# Patient Record
Sex: FEMALE | Race: BLACK OR AFRICAN AMERICAN | NOT HISPANIC OR LATINO
[De-identification: names, ages, dates, MRNs, and addresses within clinical notes are randomized per-mention and may not be internally consistent; named-entity substitution may affect disease eponyms.]

---

## 2017-06-15 PROBLEM — Z00.00 ENCOUNTER FOR PREVENTIVE HEALTH EXAMINATION: Status: ACTIVE | Noted: 2017-06-15

## 2017-07-21 ENCOUNTER — APPOINTMENT (OUTPATIENT)
Dept: RADIOLOGY | Facility: CLINIC | Age: 77
End: 2017-07-21
Payer: SELF-PAY

## 2017-07-21 ENCOUNTER — OUTPATIENT (OUTPATIENT)
Dept: OUTPATIENT SERVICES | Facility: HOSPITAL | Age: 77
LOS: 1 days | End: 2017-07-21
Payer: COMMERCIAL

## 2017-07-21 DIAGNOSIS — Z00.8 ENCOUNTER FOR OTHER GENERAL EXAMINATION: ICD-10-CM

## 2017-07-21 PROCEDURE — 77080 DXA BONE DENSITY AXIAL: CPT

## 2017-07-21 PROCEDURE — 77080 DXA BONE DENSITY AXIAL: CPT | Mod: 26

## 2020-06-19 ENCOUNTER — APPOINTMENT (OUTPATIENT)
Dept: RADIOLOGY | Facility: CLINIC | Age: 80
End: 2020-06-19
Payer: MEDICARE

## 2020-06-19 ENCOUNTER — OUTPATIENT (OUTPATIENT)
Dept: OUTPATIENT SERVICES | Facility: HOSPITAL | Age: 80
LOS: 1 days | End: 2020-06-19
Payer: COMMERCIAL

## 2020-06-19 DIAGNOSIS — Z00.8 ENCOUNTER FOR OTHER GENERAL EXAMINATION: ICD-10-CM

## 2020-06-19 PROCEDURE — 77080 DXA BONE DENSITY AXIAL: CPT

## 2020-06-19 PROCEDURE — 77080 DXA BONE DENSITY AXIAL: CPT | Mod: 26

## 2021-06-28 ENCOUNTER — APPOINTMENT (OUTPATIENT)
Dept: UROLOGY | Facility: CLINIC | Age: 81
End: 2021-06-28
Payer: MEDICARE

## 2021-06-28 VITALS
RESPIRATION RATE: 15 BRPM | BODY MASS INDEX: 24.11 KG/M2 | HEART RATE: 74 BPM | SYSTOLIC BLOOD PRESSURE: 125 MMHG | HEIGHT: 62 IN | WEIGHT: 131 LBS | TEMPERATURE: 97.8 F | DIASTOLIC BLOOD PRESSURE: 86 MMHG | OXYGEN SATURATION: 96 %

## 2021-06-28 DIAGNOSIS — Z86.79 PERSONAL HISTORY OF OTHER DISEASES OF THE CIRCULATORY SYSTEM: ICD-10-CM

## 2021-06-28 DIAGNOSIS — Z78.9 OTHER SPECIFIED HEALTH STATUS: ICD-10-CM

## 2021-06-28 DIAGNOSIS — E78.5 HYPERLIPIDEMIA, UNSPECIFIED: ICD-10-CM

## 2021-06-28 PROCEDURE — 99072 ADDL SUPL MATRL&STAF TM PHE: CPT

## 2021-06-28 PROCEDURE — 99203 OFFICE O/P NEW LOW 30 MIN: CPT

## 2021-06-28 RX ORDER — ONDANSETRON HYDROCHLORIDE 8 MG/1
8 TABLET, FILM COATED ORAL
Refills: 0 | Status: ACTIVE | COMMUNITY

## 2021-06-28 RX ORDER — POTASSIUM CHLORIDE 750 MG/1
10 CAPSULE, EXTENDED RELEASE ORAL
Refills: 0 | Status: ACTIVE | COMMUNITY

## 2021-06-28 RX ORDER — ESOMEPRAZOLE MAGNESIUM 40 MG/1
40 CAPSULE, DELAYED RELEASE ORAL
Refills: 0 | Status: ACTIVE | COMMUNITY

## 2021-06-28 RX ORDER — AMLODIPINE BESYLATE 5 MG/1
5 TABLET ORAL
Refills: 0 | Status: ACTIVE | COMMUNITY

## 2021-06-28 RX ORDER — FLUTICASONE PROPIONATE 50 MCG
50 SPRAY, SUSPENSION NASAL
Refills: 0 | Status: ACTIVE | COMMUNITY

## 2021-06-28 RX ORDER — LOSARTAN POTASSIUM AND HYDROCHLOROTHIAZIDE 100; 12.5 MG/1; MG/1
100-12.5 TABLET, FILM COATED ORAL
Refills: 0 | Status: ACTIVE | COMMUNITY

## 2021-06-28 RX ORDER — HYDRALAZINE HYDROCHLORIDE 50 MG/1
50 TABLET ORAL
Refills: 0 | Status: ACTIVE | COMMUNITY

## 2021-06-28 RX ORDER — CICLOPIROX OLAMINE 7.7 MG/G
0.77 CREAM TOPICAL
Refills: 0 | Status: ACTIVE | COMMUNITY

## 2021-06-28 RX ORDER — CALCIUM CARBONATE/VITAMIN D3 500MG-5MCG
500-200 TABLET ORAL
Refills: 0 | Status: ACTIVE | COMMUNITY

## 2021-06-28 NOTE — HISTORY OF PRESENT ILLNESS
[FreeTextEntry1] : She is an 80-year-old woman who is seen today for initial visit.  She was told to have microscopic blood in the urine.  Patient states that 15 to 20 years ago, she was told the same.  She does not recall if she underwent work-up.  She is a non-smoker.  There was no gross hematuria.  Nocturia is 1-2 times.  At the end of the day she states that she has slight ankle edema.  She complains of daytime urinary frequency and sometimes urge incontinence.  Residual urine today was less than 100 cc.  Patient also states that she has uterine prolapse and will be following with gynecology soon.  Urinalysis and March 2021 showed 3-10 red blood cells.  Creatinine was 0.76.

## 2021-06-28 NOTE — LETTER BODY
[Dear  ___] : Dear  [unfilled], [Consult Letter:] : I had the pleasure of evaluating your patient, [unfilled]. [Consult Closing:] : Thank you very much for allowing me to participate in the care of this patient.  If you have any questions, please do not hesitate to contact me. [FreeTextEntry1] : [facility]\par [address] \[place], [place], [address]  \par [phone]

## 2021-06-28 NOTE — PHYSICAL EXAM
[General Appearance - Well Developed] : well developed [General Appearance - Well Nourished] : well nourished [Normal Appearance] : normal appearance [Well Groomed] : well groomed [General Appearance - In No Acute Distress] : no acute distress [Edema] : no peripheral edema [Respiration, Rhythm And Depth] : normal respiratory rhythm and effort [Exaggerated Use Of Accessory Muscles For Inspiration] : no accessory muscle use [Abdomen Soft] : soft [Abdomen Tenderness] : non-tender [Costovertebral Angle Tenderness] : no ~M costovertebral angle tenderness [] : no rash [No Focal Deficits] : no focal deficits [Oriented To Time, Place, And Person] : oriented to person, place, and time [Affect] : the affect was normal [Mood] : the mood was normal [Not Anxious] : not anxious [Inguinal Lymph Nodes Enlarged Bilaterally] : inguinal [FreeTextEntry1] : Bladder not distended

## 2021-06-28 NOTE — ASSESSMENT
[FreeTextEntry1] : She does not have significant nocturia.  She complains of urinary urgency and urge incontinence sometimes during the day.  She is on several medications and would rather not add any further medications to her list.  Timed voiding was discussed.  Another option would be addition of over-the-counter remedies such as Azo bladder control.  We discussed the difference between microscopic and gross hematuria. Potential benign and malignant urological conditions that can cause hematuria were reviewed. Also, non-urologic causes of hematuria were reviewed. Workup including renal imaging (ultrasonography, CT scan, MRI), urine studies and cystoscopy were reviewed. Workup based on risk stratification including age, degree of hematuria and risk factors were discussed. Risks of cystoscopy were discussed. Patient was made aware that despite adequate workup, the cause for hematuria may not be discovered and continued follow-up is recommended. Patient's questions were answered.  Urine culture will be sent.  She will undergo renal ultrasound and cystoscopy.\par \par Chance Cowart MD, FACS\par [facility]\par  of Urology\par \par [address], [address]\[place] [address]\par \par [address]\[place] [address]\par \par Tel: [phone]\par Fax: [phone]

## 2021-06-28 NOTE — REVIEW OF SYSTEMS
[Shortness Of Breath] : shortness of breath [Heartburn] : heartburn [Urine Infection (bladder/kidney)] : bladder/kidney infection [Told you have blood in urine on a urine test] : told blood was present in a urine test [Wake up at night to urinate  How many times?  ___] : wakes up to urinate [unfilled] times during the night [Strong urge to urinate] : strong urge to urinate [Leakage of urine with urgency] : leakage of urine with urgency [Leakage of urine with straining, coughing, laughing] : leakage of urine with straining, coughing, laughing [Limb Swelling] : limb swelling [Limb Weakness] : limb weakness [Negative] : Heme/Lymph

## 2021-06-29 LAB
APPEARANCE: CLEAR
BACTERIA: NEGATIVE
BILIRUBIN URINE: NEGATIVE
BLOOD URINE: NEGATIVE
CALCIUM OXALATE CRYSTALS: ABNORMAL
COLOR: YELLOW
GLUCOSE QUALITATIVE U: NEGATIVE
HYALINE CASTS: 1 /LPF
KETONES URINE: NEGATIVE
LEUKOCYTE ESTERASE URINE: NEGATIVE
MICROSCOPIC-UA: NORMAL
NITRITE URINE: NEGATIVE
PH URINE: 6.5
PROTEIN URINE: NORMAL
RED BLOOD CELLS URINE: 3 /HPF
SPECIFIC GRAVITY URINE: 1.02
SQUAMOUS EPITHELIAL CELLS: 0 /HPF
UROBILINOGEN URINE: NORMAL
WHITE BLOOD CELLS URINE: 0 /HPF

## 2021-07-08 LAB — BACTERIA UR CULT: NORMAL

## 2021-07-16 ENCOUNTER — APPOINTMENT (OUTPATIENT)
Dept: UROLOGY | Facility: CLINIC | Age: 81
End: 2021-07-16
Payer: COMMERCIAL

## 2021-07-16 PROCEDURE — 99072 ADDL SUPL MATRL&STAF TM PHE: CPT

## 2021-07-16 PROCEDURE — 52000 CYSTOURETHROSCOPY: CPT

## 2021-07-19 ENCOUNTER — NON-APPOINTMENT (OUTPATIENT)
Age: 81
End: 2021-07-19

## 2021-07-19 DIAGNOSIS — N39.41 URGE INCONTINENCE: ICD-10-CM

## 2021-07-19 LAB — BACTERIA UR CULT: ABNORMAL

## 2021-07-19 RX ORDER — CEPHALEXIN 500 MG/1
500 CAPSULE ORAL
Qty: 10 | Refills: 0 | Status: ACTIVE | COMMUNITY
Start: 2021-07-19 | End: 1900-01-01

## 2021-07-20 ENCOUNTER — NON-APPOINTMENT (OUTPATIENT)
Age: 81
End: 2021-07-20

## 2021-07-27 ENCOUNTER — APPOINTMENT (OUTPATIENT)
Dept: ULTRASOUND IMAGING | Facility: CLINIC | Age: 81
End: 2021-07-27
Payer: MEDICARE

## 2021-07-27 ENCOUNTER — OUTPATIENT (OUTPATIENT)
Dept: OUTPATIENT SERVICES | Facility: HOSPITAL | Age: 81
LOS: 1 days | End: 2021-07-27
Payer: MEDICARE

## 2021-07-27 DIAGNOSIS — R31.21 ASYMPTOMATIC MICROSCOPIC HEMATURIA: ICD-10-CM

## 2021-07-27 PROCEDURE — 76775 US EXAM ABDO BACK WALL LIM: CPT | Mod: 26

## 2021-07-27 PROCEDURE — 76775 US EXAM ABDO BACK WALL LIM: CPT

## 2021-07-30 ENCOUNTER — NON-APPOINTMENT (OUTPATIENT)
Age: 81
End: 2021-07-30

## 2023-10-09 ENCOUNTER — APPOINTMENT (OUTPATIENT)
Dept: UROLOGY | Facility: CLINIC | Age: 83
End: 2023-10-09
Payer: MEDICARE

## 2023-10-09 DIAGNOSIS — R31.21 ASYMPTOMATIC MICROSCOPIC HEMATURIA: ICD-10-CM

## 2023-10-09 DIAGNOSIS — Z87.448 PERSONAL HISTORY OF OTHER DISEASES OF URINARY SYSTEM: ICD-10-CM

## 2023-10-09 PROCEDURE — 99213 OFFICE O/P EST LOW 20 MIN: CPT

## 2024-08-22 ENCOUNTER — INPATIENT (INPATIENT)
Facility: HOSPITAL | Age: 84
LOS: 10 days | Discharge: HOME | End: 2024-09-02
Attending: STUDENT IN AN ORGANIZED HEALTH CARE EDUCATION/TRAINING PROGRAM | Admitting: STUDENT IN AN ORGANIZED HEALTH CARE EDUCATION/TRAINING PROGRAM
Payer: MEDICARE

## 2024-08-22 VITALS
DIASTOLIC BLOOD PRESSURE: 74 MMHG | OXYGEN SATURATION: 100 % | TEMPERATURE: 98 F | RESPIRATION RATE: 16 BRPM | HEART RATE: 58 BPM | WEIGHT: 139.99 LBS | SYSTOLIC BLOOD PRESSURE: 160 MMHG | HEIGHT: 55 IN

## 2024-08-22 DIAGNOSIS — M86.9 OSTEOMYELITIS, UNSPECIFIED: ICD-10-CM

## 2024-08-22 LAB
ALBUMIN SERPL ELPH-MCNC: 4 G/DL — SIGNIFICANT CHANGE UP (ref 3.3–5)
ALP SERPL-CCNC: 87 U/L — SIGNIFICANT CHANGE UP (ref 40–120)
ALT FLD-CCNC: 13 U/L — SIGNIFICANT CHANGE UP (ref 4–33)
ANION GAP SERPL CALC-SCNC: 13 MMOL/L — SIGNIFICANT CHANGE UP (ref 7–14)
AST SERPL-CCNC: 27 U/L — SIGNIFICANT CHANGE UP (ref 4–32)
BASOPHILS # BLD AUTO: 0.02 K/UL — SIGNIFICANT CHANGE UP (ref 0–0.2)
BASOPHILS NFR BLD AUTO: 0.3 % — SIGNIFICANT CHANGE UP (ref 0–2)
BILIRUB SERPL-MCNC: 0.3 MG/DL — SIGNIFICANT CHANGE UP (ref 0.2–1.2)
BLOOD GAS VENOUS COMPREHENSIVE RESULT: SIGNIFICANT CHANGE UP
BUN SERPL-MCNC: 28 MG/DL — HIGH (ref 7–23)
CALCIUM SERPL-MCNC: 10 MG/DL — SIGNIFICANT CHANGE UP (ref 8.4–10.5)
CHLORIDE SERPL-SCNC: 104 MMOL/L — SIGNIFICANT CHANGE UP (ref 98–107)
CO2 SERPL-SCNC: 25 MMOL/L — SIGNIFICANT CHANGE UP (ref 22–31)
CREAT SERPL-MCNC: 0.97 MG/DL — SIGNIFICANT CHANGE UP (ref 0.5–1.3)
CRP SERPL-MCNC: 8.6 MG/L — HIGH
EGFR: 57 ML/MIN/1.73M2 — LOW
EOSINOPHIL # BLD AUTO: 0.22 K/UL — SIGNIFICANT CHANGE UP (ref 0–0.5)
EOSINOPHIL NFR BLD AUTO: 3.4 % — SIGNIFICANT CHANGE UP (ref 0–6)
ERYTHROCYTE [SEDIMENTATION RATE] IN BLOOD: 36 MM/HR — HIGH (ref 4–25)
GLUCOSE SERPL-MCNC: 93 MG/DL — SIGNIFICANT CHANGE UP (ref 70–99)
HCT VFR BLD CALC: 34.6 % — SIGNIFICANT CHANGE UP (ref 34.5–45)
HGB BLD-MCNC: 11 G/DL — LOW (ref 11.5–15.5)
IANC: 4.03 K/UL — SIGNIFICANT CHANGE UP (ref 1.8–7.4)
IMM GRANULOCYTES NFR BLD AUTO: 0.3 % — SIGNIFICANT CHANGE UP (ref 0–0.9)
LYMPHOCYTES # BLD AUTO: 1.3 K/UL — SIGNIFICANT CHANGE UP (ref 1–3.3)
LYMPHOCYTES # BLD AUTO: 20.3 % — SIGNIFICANT CHANGE UP (ref 13–44)
MCHC RBC-ENTMCNC: 29.1 PG — SIGNIFICANT CHANGE UP (ref 27–34)
MCHC RBC-ENTMCNC: 31.8 GM/DL — LOW (ref 32–36)
MCV RBC AUTO: 91.5 FL — SIGNIFICANT CHANGE UP (ref 80–100)
MONOCYTES # BLD AUTO: 0.82 K/UL — SIGNIFICANT CHANGE UP (ref 0–0.9)
MONOCYTES NFR BLD AUTO: 12.8 % — SIGNIFICANT CHANGE UP (ref 2–14)
NEUTROPHILS # BLD AUTO: 4.03 K/UL — SIGNIFICANT CHANGE UP (ref 1.8–7.4)
NEUTROPHILS NFR BLD AUTO: 62.9 % — SIGNIFICANT CHANGE UP (ref 43–77)
NRBC # BLD: 0 /100 WBCS — SIGNIFICANT CHANGE UP (ref 0–0)
NRBC # FLD: 0 K/UL — SIGNIFICANT CHANGE UP (ref 0–0)
PLATELET # BLD AUTO: 271 K/UL — SIGNIFICANT CHANGE UP (ref 150–400)
POTASSIUM SERPL-MCNC: 4.2 MMOL/L — SIGNIFICANT CHANGE UP (ref 3.5–5.3)
POTASSIUM SERPL-SCNC: 4.2 MMOL/L — SIGNIFICANT CHANGE UP (ref 3.5–5.3)
PROT SERPL-MCNC: 7.7 G/DL — SIGNIFICANT CHANGE UP (ref 6–8.3)
RBC # BLD: 3.78 M/UL — LOW (ref 3.8–5.2)
RBC # FLD: 14.2 % — SIGNIFICANT CHANGE UP (ref 10.3–14.5)
SODIUM SERPL-SCNC: 142 MMOL/L — SIGNIFICANT CHANGE UP (ref 135–145)
WBC # BLD: 6.41 K/UL — SIGNIFICANT CHANGE UP (ref 3.8–10.5)
WBC # FLD AUTO: 6.41 K/UL — SIGNIFICANT CHANGE UP (ref 3.8–10.5)

## 2024-08-22 PROCEDURE — 99285 EMERGENCY DEPT VISIT HI MDM: CPT

## 2024-08-22 PROCEDURE — 73630 X-RAY EXAM OF FOOT: CPT | Mod: 26,LT

## 2024-08-22 PROCEDURE — 93971 EXTREMITY STUDY: CPT | Mod: 26,LT

## 2024-08-22 RX ORDER — LIDOCAINE HCL 20 MG/ML
5 VIAL (ML) INJECTION ONCE
Refills: 0 | Status: COMPLETED | OUTPATIENT
Start: 2024-08-22 | End: 2024-08-22

## 2024-08-22 RX ORDER — VANCOMYCIN/0.9 % SOD CHLORIDE 1.75G/25
1000 PLASTIC BAG, INJECTION (ML) INTRAVENOUS ONCE
Refills: 0 | Status: COMPLETED | OUTPATIENT
Start: 2024-08-22 | End: 2024-08-22

## 2024-08-22 RX ORDER — PIPERACILLIN SODIUM AND TAZOBACTAM SODIUM 3; .375 G/15ML; G/15ML
3.38 INJECTION, POWDER, FOR SOLUTION INTRAVENOUS ONCE
Refills: 0 | Status: COMPLETED | OUTPATIENT
Start: 2024-08-22 | End: 2024-08-22

## 2024-08-22 RX ADMIN — PIPERACILLIN SODIUM AND TAZOBACTAM SODIUM 200 GRAM(S): 3; .375 INJECTION, POWDER, FOR SOLUTION INTRAVENOUS at 20:04

## 2024-08-22 RX ADMIN — Medication 250 MILLIGRAM(S): at 21:02

## 2024-08-22 RX ADMIN — Medication 5 MILLILITER(S): at 21:45

## 2024-08-22 RX ADMIN — PIPERACILLIN SODIUM AND TAZOBACTAM SODIUM 3.38 GRAM(S): 3; .375 INJECTION, POWDER, FOR SOLUTION INTRAVENOUS at 21:44

## 2024-08-22 NOTE — ED ADULT NURSE NOTE - CHIEF COMPLAINT QUOTE
Pt sent into ED by podiatrist for r/o osteomyelitis of L foot. States she was seen at [facility] ED and discharged on Sunday. Pt denies any chest pain, sob, dizziness, fever/chills.  PMH HTN, HLD, CKD.

## 2024-08-22 NOTE — ED ADULT NURSE REASSESSMENT NOTE - NS ED NURSE REASSESS COMMENT FT1
pt is ax4, on room air, pt reports mild pain when right foot is being touched, pt is aware of POC, bed in low position, side rails are raised, bed in low position, no other concerns are noted.

## 2024-08-22 NOTE — ED ADULT NURSE NOTE - NSFALLRISKINTERV_ED_ALL_ED

## 2024-08-22 NOTE — ED PROVIDER NOTE - PROGRESS NOTE DETAILS
Patient does not have an elevated white blood cell count on CBC, but was found to have cortical erosion along the medial aspect of the first distal phalanx which is concerning for osteomyelitis on preliminary read from radiology.  Will start vancomycin and Zosyn for empiric antibiotic coverage. Paged podiatry for consult. Annelise Toledo DO (PGY-2): XRs concerning for osteo of first distal phalanx. Podiatry paged. Annelise Toledo DO (PGY-2): Podiatry at bedside. Patient admitted to medicine.

## 2024-08-22 NOTE — ED PROVIDER NOTE - OBJECTIVE STATEMENT
83-year-old female with past medical history of hypertension presents to the emergency department for toe pain.  She is being sent in by outpatient podiatry who saw her earlier today for possibility of osteomyelitis.  This has been going on for approximately 3 weeks.  This started with pain and developed into a purulent open wound. Patient was seen at [facility] Sunday and discharged on clindamycin.  She started clindamycin on Monday and has been compliant with her medications.  She followed up with [facility] podiatry outpatient today and was told to come to the emergency department.  Patient denies all other symptoms aside from toe pain and she has been ambulating well.  Denies fever, chills, nausea, vomiting, chest pain, shortness of breath.  She states she has never had a other cellulitic or osteomyelitic infections.  Patient states she is not diabetic.

## 2024-08-22 NOTE — ED PROVIDER NOTE - PHYSICAL EXAMINATION
Gen: No acute distress  HEENT: EOMI, no nasal discharge  CV: RRR, +S1/S2, no M/R/G, 2+ radial pulses b/l, 2+ dorsalis pedis pulses.   Resp: CTAB, no W/R/R, no accessory muscle use, no increased work of breathing  GI: Abdomen soft non-distended, No tenderness to palpation.   MSK: Open, foul smelling wound on the left big toe. Raw, open skin noted with tenderness to palpation. Left great toe toenail appears loose with some possible purulent discharge underneath.  Left great toe to calf is warm. Left Great toe is also edematous. No crepitus or visualization of bone.   Neuro: A&Ox4, following commands, moving all four extremities spontaneously  Psych: appropriate mood Gen: No acute distress  HEENT: EOMI, no nasal discharge  CV: RRR, +S1/S2, no M/R/G, 2+ radial pulses b/l, 2+ dorsalis pedis pulses.   Resp: CTAB, no W/R/R, no accessory muscle use, no increased work of breathing  GI: Abdomen soft non-distended, No tenderness to palpation.   MSK: Open, foul smelling wound on the left big toe with raw skin noted with tenderness to palpation. Left great toe toenail appears loose with some possible purulent discharge underneath.  Left great toe to calf is warm. Left Great toe is also edematous. No crepitus or visualization of bone. Warmth of the left toe extends up through the calf and shin.   Neuro: A&Ox4, following commands, moving all four extremities spontaneously  Psych: appropriate mood Gen: No acute distress  HEENT: EOMI, no nasal discharge  CV: RRR, +S1/S2, no M/R/G, 2+ radial pulses b/l, 2+ dorsalis pedis pulses.   Resp: CTAB, no W/R/R, no accessory muscle use, no increased work of breathing  GI: Abdomen soft non-distended, No tenderness to palpation.   MSK: Open, foul smelling wound on the left big toe with raw skin noted with tenderness to palpation. Left great toe toenail appears loose with some possible purulent discharge underneath. Left Great toe is also edematous. No crepitus or visualization of bone. Warmth of the left toe extends up through the calf and shin.   Neuro: A&Ox4, following commands, moving all four extremities spontaneously  Psych: appropriate mood

## 2024-08-22 NOTE — ED ADULT NURSE NOTE - OBJECTIVE STATEMENT
[place] RN: Pt arrives to room 9. Pt is A and Ox4 and ambulatory. Hx: HTN, GERD. Pt arrives to the ED complaining of left great toe infection x2 weeks. Pt endorses minor pain to the site. Toe is notably swollen and discolored. Airway is patent, respirations are even and unlabored. Pt denies chest pain, shortness of breath, headaches, dizziness, numbness and tingling, fever and chills, nausea/vomitting/diarrhea. Skin is clean, dry, intact, and appropriate for race.  20 G IV placed in the  LAC. Labs sent per provider orders. Plan of care ongoing, safety maintained. Report given to primary RN Marcella.

## 2024-08-22 NOTE — ED PROVIDER NOTE - CLINICAL SUMMARY MEDICAL DECISION MAKING FREE TEXT BOX
83-year-old female with history of hypertension presents to the Emergency Department for toe pain.  This has been ongoing for approximately 3 weeks and has progressively gotten worse.  Patient was seen outpatient by podiatry and told to come to the emergency department for possibility of osteomyelitis.  She was sent over here patient denies fever, chills, systemic symptoms denies chest pain yes and shortness of breath as well. Paged in house podiatry for consult for further evaluation. Patient states allergy to IV contrast.     Due to patient's symptoms, history, and physical exam, CBC, CMP, ESR, and CRP ordered. Xray of Left foot ordered to evaluate bones with possibilty of visualization of osteomyelitis if present. With patient's left lower extremity edema, Dupplex U/S ordered for further evaluation. 83-year-old female with history of hypertension presents to the Emergency Department for toe pain.  This has been ongoing for approximately 3 weeks and has progressively gotten worse.  Patient was seen outpatient by podiatry and told to come to the emergency department for possibility of osteomyelitis.  She was sent over here patient denies fever, chills, systemic symptoms denies chest pain yes and shortness of breath as well. Paged in house podiatry for consult for further evaluation. Patient states allergy to IV contrast.     Due to patient's symptoms, history, and physical exam, CBC, CMP, ESR, and CRP ordered. Xray of Left foot ordered to evaluate bones with possibility of visualization of osteomyelitis if present. With patient's left lower extremity edema, Duplex U/S ordered for further evaluation.

## 2024-08-22 NOTE — CONSULT NOTE ADULT - SUBJECTIVE AND OBJECTIVE BOX
Patient is a 83y old  Female who presents with a chief complaint of left hallux wound    HPI:      PAST MEDICAL & SURGICAL HISTORY:      MEDICATIONS  (STANDING):    MEDICATIONS  (PRN):      Allergies    iodinated radiocontrast agents (Chills)  Prevacid (Chills)    Intolerances        VITALS:    Vital Signs Last 24 Hrs  T(C): 36.7 (22 Aug 2024 15:37), Max: 36.7 (22 Aug 2024 15:37)  T(F): 98.1 (22 Aug 2024 15:37), Max: 98.1 (22 Aug 2024 15:37)  HR: 58 (22 Aug 2024 15:37) (58 - 58)  BP: 160/74 (22 Aug 2024 15:37) (160/74 - 160/74)  BP(mean): --  RR: 16 (22 Aug 2024 15:37) (16 - 16)  SpO2: 100% (22 Aug 2024 15:37) (100% - 100%)    Parameters below as of 22 Aug 2024 15:37  Patient On (Oxygen Delivery Method): room air        LABS:                          11.0   6.41  )-----------( 271      ( 22 Aug 2024 17:37 )             34.6       08-22    142  |  104  |  28<H>  ----------------------------<  93  4.2   |  25  |  0.97    Ca    10.0      22 Aug 2024 17:37    TPro  7.7  /  Alb  4.0  /  TBili  0.3  /  DBili  x   /  AST  27  /  ALT  13  /  AlkPhos  87  08-22      CAPILLARY BLOOD GLUCOSE              LOWER EXTREMITY PHYSICAL EXAM:    Vascular: DP/PT 2/4, B/L, CFT < 3 seconds B/L, Temperature gradient warm to cool, B/L.   Neuro: Epicritic sensation intact to the level of, B/L.  Musculoskeletal/Ortho: unremarkable  Skin: left dorsal hallux wound to bone deep to the nail plate and within the first interspace, nail plate loosening. erythema globally about the first digit, strong malodor, purulent drainage, no crepitus      RADIOLOGY & ADDITIONAL STUDIES:

## 2024-08-22 NOTE — ED ADULT TRIAGE NOTE - AS PAIN REST
Patient Education     Near-Fainting: Vagal Reaction  Fainting (syncope) is passing out. It's a temporary loss of consciousness. Near-fainting is feeling like you are going to faint without the losing consciousness. Fainting and near-fainting can be caused by a vagal reaction. This is an involuntary response of the body to a physical or emotional stress.   This reaction causes a sudden drop in your blood pressure and a slowed heart rate. If your heart rate is slow enough, you may faint or near-faint. Lying down often stops the reaction very quickly.   These are symptoms of near-fainting:   · Feeling lightheaded or like you are going to faint  · Weak pulse  · Nausea  · Sweating  · Blurred vision or feeling like your vision is \"blacking out\"  · Fluttering heartbeat (palpitations)  · Chest pain  · Trouble breathing  · Cool and clammy skin  Causes for near-fainting include:  · Sudden emotional stress such as fear, pain, panic, or the sight of blood  · Straining or overexertion, straining while using the toilet, coughing, or sneezing  · Standing up too quickly, or standing up for too long a time  · Pregnancy  · Certain medicines  Home care  These tips will help you care for yourself at home:   · Rest today and go back to your normal activities when you feel back to normal.  · If you become lightheaded or dizzy, lie down right away. Put your legs up so they are higher than your heart..  · If you are sitting down and feel faint, don't stand up. This may make the feeling worse.  · Drink plenty of fluids, and don't skip meals.  · Don't stand for long periods or stay in hot places.  · Do what you can to prevent constipation. If you bear down a lot when trying to have a bowel movement, this can trigger a vagal response.  Check with your doctor to see if you need tests such as a tilt-table test, heart rhythm monitoring, or blood tests. Review the medicines you take with your healthcare provider and pharmacist to be sure the  symptoms you have are not a side effect of a medicine.   Follow-up care  Follow up with your healthcare provider, or as advised.    If you have frequent episodes of near-fainting or vagal reactions, be cautious about activities such as driving. You could harm yourself or others if you were to faint. Don't drive or operate heavy machinery if you are feeling like you may faint.     Call 911  Call 911 if any of these occur:   · Another fainting spell not explained by the common causes listed above  ·   · Chest, arm, neck, jaw, back, or belly (abdominal) pain  · Shortness of breath  · Weakness, tingling, or numbness in one side of the face, one arm or leg  · Slurred speech, confusion, or trouble walking or seeing  · Seizure  · Blood in vomit or stools (black or red color)  When to seek medical advice  Call your healthcare provider right away if you have occasional mild lightheadedness, especially when standing up.   [employer] last reviewed this educational content on 11/1/2019  © 0840-0423 [employer]. All rights reserved. This information is not intended as a substitute for professional medical care. Always follow your healthcare professional's instructions.            0 (no pain/absence of nonverbal indicators of pain)

## 2024-08-22 NOTE — ED PROVIDER NOTE - ATTENDING CONTRIBUTION TO CARE
84yo F ho htn presents with 3 weeks of left 1st digit swelling and pain. was seen in [facility] recently and started on clinda, seen by podiatry today and told to go to ed [facility]. pt states to note swelling and purulent discharge several weeks ago and has not been improving, nof rina or chills   pt with swelling of first digit and warmth extending to mid calf  infeected left first toe   labs, xr, podiatry, likely tba

## 2024-08-23 DIAGNOSIS — Z90.49 ACQUIRED ABSENCE OF OTHER SPECIFIED PARTS OF DIGESTIVE TRACT: Chronic | ICD-10-CM

## 2024-08-23 DIAGNOSIS — Z29.9 ENCOUNTER FOR PROPHYLACTIC MEASURES, UNSPECIFIED: ICD-10-CM

## 2024-08-23 DIAGNOSIS — M86.9 OSTEOMYELITIS, UNSPECIFIED: ICD-10-CM

## 2024-08-23 DIAGNOSIS — I10 ESSENTIAL (PRIMARY) HYPERTENSION: ICD-10-CM

## 2024-08-23 DIAGNOSIS — N18.9 CHRONIC KIDNEY DISEASE, UNSPECIFIED: ICD-10-CM

## 2024-08-23 LAB
ALBUMIN SERPL ELPH-MCNC: 3.6 G/DL — SIGNIFICANT CHANGE UP (ref 3.3–5)
ALP SERPL-CCNC: 81 U/L — SIGNIFICANT CHANGE UP (ref 40–120)
ALT FLD-CCNC: 10 U/L — SIGNIFICANT CHANGE UP (ref 4–33)
ANION GAP SERPL CALC-SCNC: 11 MMOL/L — SIGNIFICANT CHANGE UP (ref 7–14)
APTT BLD: 34.6 SEC — SIGNIFICANT CHANGE UP (ref 24.5–35.6)
AST SERPL-CCNC: 24 U/L — SIGNIFICANT CHANGE UP (ref 4–32)
BASOPHILS # BLD AUTO: 0.03 K/UL — SIGNIFICANT CHANGE UP (ref 0–0.2)
BASOPHILS NFR BLD AUTO: 0.4 % — SIGNIFICANT CHANGE UP (ref 0–2)
BILIRUB SERPL-MCNC: 0.4 MG/DL — SIGNIFICANT CHANGE UP (ref 0.2–1.2)
BLD GP AB SCN SERPL QL: NEGATIVE — SIGNIFICANT CHANGE UP
BUN SERPL-MCNC: 27 MG/DL — HIGH (ref 7–23)
CALCIUM SERPL-MCNC: 9.5 MG/DL — SIGNIFICANT CHANGE UP (ref 8.4–10.5)
CHLORIDE SERPL-SCNC: 103 MMOL/L — SIGNIFICANT CHANGE UP (ref 98–107)
CO2 SERPL-SCNC: 25 MMOL/L — SIGNIFICANT CHANGE UP (ref 22–31)
CREAT SERPL-MCNC: 1.12 MG/DL — SIGNIFICANT CHANGE UP (ref 0.5–1.3)
EGFR: 48 ML/MIN/1.73M2 — LOW
EOSINOPHIL # BLD AUTO: 0.25 K/UL — SIGNIFICANT CHANGE UP (ref 0–0.5)
EOSINOPHIL NFR BLD AUTO: 3.5 % — SIGNIFICANT CHANGE UP (ref 0–6)
GLUCOSE SERPL-MCNC: 98 MG/DL — SIGNIFICANT CHANGE UP (ref 70–99)
GRAM STN FLD: ABNORMAL
HCT VFR BLD CALC: 34.1 % — LOW (ref 34.5–45)
HGB BLD-MCNC: 11 G/DL — LOW (ref 11.5–15.5)
IANC: 4.84 K/UL — SIGNIFICANT CHANGE UP (ref 1.8–7.4)
IMM GRANULOCYTES NFR BLD AUTO: 0.3 % — SIGNIFICANT CHANGE UP (ref 0–0.9)
INR BLD: 0.97 RATIO — SIGNIFICANT CHANGE UP (ref 0.85–1.18)
LYMPHOCYTES # BLD AUTO: 1.08 K/UL — SIGNIFICANT CHANGE UP (ref 1–3.3)
LYMPHOCYTES # BLD AUTO: 15.1 % — SIGNIFICANT CHANGE UP (ref 13–44)
MAGNESIUM SERPL-MCNC: 2 MG/DL — SIGNIFICANT CHANGE UP (ref 1.6–2.6)
MCHC RBC-ENTMCNC: 29.6 PG — SIGNIFICANT CHANGE UP (ref 27–34)
MCHC RBC-ENTMCNC: 32.3 GM/DL — SIGNIFICANT CHANGE UP (ref 32–36)
MCV RBC AUTO: 91.7 FL — SIGNIFICANT CHANGE UP (ref 80–100)
MONOCYTES # BLD AUTO: 0.92 K/UL — HIGH (ref 0–0.9)
MONOCYTES NFR BLD AUTO: 12.9 % — SIGNIFICANT CHANGE UP (ref 2–14)
MRSA PCR RESULT.: SIGNIFICANT CHANGE UP
NEUTROPHILS # BLD AUTO: 4.84 K/UL — SIGNIFICANT CHANGE UP (ref 1.8–7.4)
NEUTROPHILS NFR BLD AUTO: 67.8 % — SIGNIFICANT CHANGE UP (ref 43–77)
NRBC # BLD: 0 /100 WBCS — SIGNIFICANT CHANGE UP (ref 0–0)
NRBC # FLD: 0 K/UL — SIGNIFICANT CHANGE UP (ref 0–0)
PHOSPHATE SERPL-MCNC: 3 MG/DL — SIGNIFICANT CHANGE UP (ref 2.5–4.5)
PLATELET # BLD AUTO: 250 K/UL — SIGNIFICANT CHANGE UP (ref 150–400)
POTASSIUM SERPL-MCNC: 4.3 MMOL/L — SIGNIFICANT CHANGE UP (ref 3.5–5.3)
POTASSIUM SERPL-SCNC: 4.3 MMOL/L — SIGNIFICANT CHANGE UP (ref 3.5–5.3)
PROT SERPL-MCNC: 6.8 G/DL — SIGNIFICANT CHANGE UP (ref 6–8.3)
PROTHROM AB SERPL-ACNC: 10.9 SEC — SIGNIFICANT CHANGE UP (ref 9.5–13)
RBC # BLD: 3.72 M/UL — LOW (ref 3.8–5.2)
RBC # FLD: 14 % — SIGNIFICANT CHANGE UP (ref 10.3–14.5)
RH IG SCN BLD-IMP: POSITIVE — SIGNIFICANT CHANGE UP
S AUREUS DNA NOSE QL NAA+PROBE: SIGNIFICANT CHANGE UP
SODIUM SERPL-SCNC: 139 MMOL/L — SIGNIFICANT CHANGE UP (ref 135–145)
SPECIMEN SOURCE: SIGNIFICANT CHANGE UP
WBC # BLD: 7.14 K/UL — SIGNIFICANT CHANGE UP (ref 3.8–10.5)
WBC # FLD AUTO: 7.14 K/UL — SIGNIFICANT CHANGE UP (ref 3.8–10.5)

## 2024-08-23 PROCEDURE — 99223 1ST HOSP IP/OBS HIGH 75: CPT

## 2024-08-23 PROCEDURE — 73720 MRI LWR EXTREMITY W/O&W/DYE: CPT | Mod: 26,LT

## 2024-08-23 PROCEDURE — 99222 1ST HOSP IP/OBS MODERATE 55: CPT

## 2024-08-23 RX ORDER — ENOXAPARIN SODIUM 100 MG/ML
40 INJECTION SUBCUTANEOUS EVERY 24 HOURS
Refills: 0 | Status: DISCONTINUED | OUTPATIENT
Start: 2024-08-23 | End: 2024-08-26

## 2024-08-23 RX ORDER — PIPERACILLIN SODIUM AND TAZOBACTAM SODIUM 3; .375 G/15ML; G/15ML
3.38 INJECTION, POWDER, FOR SOLUTION INTRAVENOUS EVERY 8 HOURS
Refills: 0 | Status: DISCONTINUED | OUTPATIENT
Start: 2024-08-23 | End: 2024-09-01

## 2024-08-23 RX ORDER — AMLODIPINE BESYLATE 10 MG/1
7.5 TABLET ORAL DAILY
Refills: 0 | Status: DISCONTINUED | OUTPATIENT
Start: 2024-08-23 | End: 2024-09-02

## 2024-08-23 RX ORDER — POTASSIUM CHLORIDE 10 MEQ
1 TABLET, EXT RELEASE, PARTICLES/CRYSTALS ORAL
Refills: 0 | DISCHARGE

## 2024-08-23 RX ORDER — ACETAMINOPHEN 325 MG/1
650 TABLET ORAL ONCE
Refills: 0 | Status: COMPLETED | OUTPATIENT
Start: 2024-08-23 | End: 2024-08-23

## 2024-08-23 RX ORDER — VANCOMYCIN/0.9 % SOD CHLORIDE 1.75G/25
1000 PLASTIC BAG, INJECTION (ML) INTRAVENOUS EVERY 24 HOURS
Refills: 0 | Status: DISCONTINUED | OUTPATIENT
Start: 2024-08-23 | End: 2024-08-24

## 2024-08-23 RX ORDER — LOSARTAN POTASSIUM 50 MG/1
100 TABLET ORAL DAILY
Refills: 0 | Status: DISCONTINUED | OUTPATIENT
Start: 2024-08-23 | End: 2024-09-02

## 2024-08-23 RX ORDER — METOPROLOL TARTRATE 100 MG/1
25 TABLET ORAL AT BEDTIME
Refills: 0 | Status: DISCONTINUED | OUTPATIENT
Start: 2024-08-23 | End: 2024-09-02

## 2024-08-23 RX ORDER — METOPROLOL TARTRATE 100 MG/1
1 TABLET ORAL
Refills: 0 | DISCHARGE

## 2024-08-23 RX ORDER — LOSARTAN POTASSIUM AND HYDROCHLOROTHIAZIDE 100; 25 MG/1; MG/1
1 TABLET, FILM COATED ORAL
Refills: 0 | DISCHARGE

## 2024-08-23 RX ORDER — ACETAMINOPHEN 325 MG/1
650 TABLET ORAL EVERY 6 HOURS
Refills: 0 | Status: DISCONTINUED | OUTPATIENT
Start: 2024-08-23 | End: 2024-09-02

## 2024-08-23 RX ORDER — AMLODIPINE BESYLATE 10 MG/1
1 TABLET ORAL
Refills: 0 | DISCHARGE

## 2024-08-23 RX ADMIN — PIPERACILLIN SODIUM AND TAZOBACTAM SODIUM 25 GRAM(S): 3; .375 INJECTION, POWDER, FOR SOLUTION INTRAVENOUS at 21:44

## 2024-08-23 RX ADMIN — ACETAMINOPHEN 650 MILLIGRAM(S): 325 TABLET ORAL at 01:39

## 2024-08-23 RX ADMIN — ACETAMINOPHEN 650 MILLIGRAM(S): 325 TABLET ORAL at 10:26

## 2024-08-23 RX ADMIN — LOSARTAN POTASSIUM 100 MILLIGRAM(S): 50 TABLET ORAL at 06:03

## 2024-08-23 RX ADMIN — PIPERACILLIN SODIUM AND TAZOBACTAM SODIUM 25 GRAM(S): 3; .375 INJECTION, POWDER, FOR SOLUTION INTRAVENOUS at 04:56

## 2024-08-23 RX ADMIN — AMLODIPINE BESYLATE 7.5 MILLIGRAM(S): 10 TABLET ORAL at 06:02

## 2024-08-23 RX ADMIN — ACETAMINOPHEN 650 MILLIGRAM(S): 325 TABLET ORAL at 00:39

## 2024-08-23 RX ADMIN — PIPERACILLIN SODIUM AND TAZOBACTAM SODIUM 25 GRAM(S): 3; .375 INJECTION, POWDER, FOR SOLUTION INTRAVENOUS at 11:31

## 2024-08-23 RX ADMIN — Medication 250 MILLIGRAM(S): at 20:19

## 2024-08-23 RX ADMIN — ACETAMINOPHEN 650 MILLIGRAM(S): 325 TABLET ORAL at 09:26

## 2024-08-23 RX ADMIN — METOPROLOL TARTRATE 25 MILLIGRAM(S): 100 TABLET ORAL at 21:46

## 2024-08-23 RX ADMIN — ENOXAPARIN SODIUM 40 MILLIGRAM(S): 100 INJECTION SUBCUTANEOUS at 06:03

## 2024-08-23 NOTE — DISCHARGE NOTE PROVIDER - HOSPITAL COURSE
HPI:  83F with PMHx HTN, HLD, CKD, bilateral carpal tunnel syndrome, GERD presenting with L 1st toe pain, drainage x3 weeks. The patient has been following her podiatrist for a L 1st toe wound for 3 weeks. It initially started with malodor and gradually over several weeks the toenail degraded and came off. She believes pus may have drained from it as well. She had an ulceration to the dorsum side of the toe and worsened pain but able to ambulate. She was seen at [facility] on Sunday and discharged with clindamycin TID per patient and she has been taking. Per [facility] documentation, imaging wasn't available and possibly not done and patient was sent to the hospital today for  r/o OM. Denies fevers, chills, cough, CP, SOB, abdominal pain, vomiting, diarrhea, LE edema, LH, dizziness, LOC, hx PAD, ascending erythema, trauma to toe.    In ED given vanc and zosyn. Seen by podiatry. (23 Aug 2024 01:01)    During admission, Xray of the L foot showed likely osteomyelitis. This was followed up with an MRI which showed osteomyelitis of the first digit distal phalanx. Podiatry performed amputation of the distal phalanx.    Medication Changes:    Important follow up:       HPI:  83F with PMHx HTN, HLD, CKD, bilateral carpal tunnel syndrome, GERD presenting with L 1st toe pain, drainage x3 weeks. The patient has been following her podiatrist for a L 1st toe wound for 3 weeks. It initially started with malodor and gradually over several weeks the toenail degraded and came off. She believes pus may have drained from it as well. She had an ulceration to the dorsum side of the toe and worsened pain but able to ambulate. She was seen at [facility] on Sunday and discharged with clindamycin TID per patient and she has been taking. Per [facility] documentation, imaging wasn't available and possibly not done and patient was sent to the hospital today for  r/o OM. Denies fevers, chills, cough, CP, SOB, abdominal pain, vomiting, diarrhea, LE edema, LH, dizziness, LOC, hx PAD, ascending erythema, trauma to toe.    In ED given vanc and zosyn. Seen by podiatry. (23 Aug 2024 01:01)    During admission, Xray of the L foot showed likely osteomyelitis. This was followed up with an MRI which showed osteomyelitis of the first digit distal phalanx. Podiatry performed amputation of the distal phalanx. Wound cultures grew pseudomonas and enterococcus. BCx negative. Continued on Vancomycin for 4 days and Zosyn (8/22- ***). Multiple discussions between ID, podiatry, patient, and daughter regarding treatment options (sugical vs IV Abx vs PO). Ultimately patient decided on ***    Medication Changes:    Important follow up:  Infectious Disease  Podiatry     HPI:  83F with PMHx HTN, HLD, CKD, bilateral carpal tunnel syndrome, GERD presenting with L 1st toe pain, drainage x3 weeks. The patient has been following her podiatrist for a L 1st toe wound for 3 weeks. It initially started with malodor and gradually over several weeks the toenail degraded and came off. She believes pus may have drained from it as well. She had an ulceration to the dorsum side of the toe and worsened pain but able to ambulate. She was seen at [facility] on Sunday and discharged with clindamycin TID per patient and she has been taking. Per [facility] documentation, imaging wasn't available and possibly not done and patient was sent to the hospital today for  r/o OM. Denies fevers, chills, cough, CP, SOB, abdominal pain, vomiting, diarrhea, LE edema, LH, dizziness, LOC, hx PAD, ascending erythema, trauma to toe.    In ED given vanc and zosyn. Seen by podiatry. (23 Aug 2024 01:01)    During admission, Xray of the L foot showed likely osteomyelitis. This was followed up with an MRI which showed osteomyelitis of the first digit distal phalanx. Podiatry performed amputation of the distal phalanx. Wound cultures grew pseudomonas and enterococcus. BCx negative. Continued on Vancomycin for 4 days and Zosyn (8/22- ***). Multiple discussions between ID, podiatry, patient, and daughter regarding treatment options (surgical vs IV Abx vs PO). Ultimately patient decided on ***    Medication Changes:      Important follow up:  Infectious Disease  Podiatry     HPI:  83F with PMHx HTN, HLD, CKD, bilateral carpal tunnel syndrome, GERD presenting with L 1st toe pain, drainage x3 weeks. The patient has been following her podiatrist for a L 1st toe wound for 3 weeks. It initially started with malodor and gradually over several weeks the toenail degraded and came off. She believes pus may have drained from it as well. She had an ulceration to the dorsum side of the toe and worsened pain but able to ambulate. She was seen at [facility] on Sunday and discharged with clindamycin TID per patient and she has been taking. Per [facility] documentation, imaging wasn't available and possibly not done and patient was sent to the hospital today for  r/o OM. Denies fevers, chills, cough, CP, SOB, abdominal pain, vomiting, diarrhea, LE edema, LH, dizziness, LOC, hx PAD, ascending erythema, trauma to toe.    In ED given vanc and zosyn. Seen by podiatry. (23 Aug 2024 01:01)    During admission, Xray of the L foot showed likely osteomyelitis. This was followed up with an MRI which showed osteomyelitis of the first digit distal phalanx. Podiatry performed amputation of the distal phalanx. Wound cultures grew pseudomonas and enterococcus. BCx negative. Continued on Vancomycin for 4 days and Zosyn (8/22- ***). Multiple discussions between ID, podiatry, patient, and daughter regarding treatment options (surgical vs IV Abx vs PO). Ultimately patient decided on oral abx with  Cipro 500 mg BID and Augmentin 875/125 mg BID until 10/2/24.    Medication Changes:  Cipro 500 mg BID and Augmentin 875/125 mg BID until 10/2/24    Important follow up:  Infectious Disease  Podiatry     HPI:  83F with PMHx HTN, HLD, CKD, bilateral carpal tunnel syndrome, GERD presenting with L 1st toe pain, drainage x3 weeks. The patient has been following her podiatrist for a L 1st toe wound for 3 weeks. It initially started with malodor and gradually over several weeks the toenail degraded and came off. She believes pus may have drained from it as well. She had an ulceration to the dorsum side of the toe and worsened pain but able to ambulate. She was seen at [facility] on Sunday and discharged with clindamycin TID per patient and she has been taking. Per [facility] documentation, imaging wasn't available and possibly not done and patient was sent to the hospital today for  r/o OM. Denies fevers, chills, cough, CP, SOB, abdominal pain, vomiting, diarrhea, LE edema, LH, dizziness, LOC, hx PAD, ascending erythema, trauma to toe.    In ED given vanc and zosyn. Seen by podiatry. (23 Aug 2024 01:01)    During admission, Xray of the L foot showed likely osteomyelitis. This was followed up with an MRI which showed osteomyelitis of the first digit distal phalanx. Podiatry performed amputation of the distal phalanx. Wound cultures grew pseudomonas and enterococcus. BCx negative. Continued on Vancomycin for 4 days and Zosyn (8/22- 9/1). Multiple discussions between ID, podiatry, patient, and daughter regarding treatment options (surgical vs IV Abx vs PO). Ultimately patient decided on oral abx with  Cipro 500 mg BID and Augmentin 875/125 mg BID until 10/2/24.    Medication Changes:  Cipro 500 mg BID and Augmentin 875/125 mg BID until 10/2/24    Important follow up:  Infectious Disease  Podiatry     HPI:  83F with PMHx HTN, HLD, CKD, bilateral carpal tunnel syndrome, GERD presenting with L 1st toe pain, drainage x3 weeks. The patient has been following her podiatrist for a L 1st toe wound for 3 weeks. It initially started with malodor and gradually over several weeks the toenail degraded and came off. She believes pus may have drained from it as well. She had an ulceration to the dorsum side of the toe and worsened pain but able to ambulate. She was seen at [facility] on Sunday and discharged with clindamycin TID per patient and she has been taking. Per [facility] documentation, imaging wasn't available and possibly not done and patient was sent to the hospital today for  r/o OM. Denies fevers, chills, cough, CP, SOB, abdominal pain, vomiting, diarrhea, LE edema, LH, dizziness, LOC, hx PAD, ascending erythema, trauma to toe.    In ED given vanc and zosyn. Seen by podiatry. (23 Aug 2024 01:01)    During admission, Xray of the L foot showed likely osteomyelitis. This was followed up with an MRI which showed osteomyelitis of the first digit distal phalanx. Podiatry performed amputation of the distal phalanx. Wound cultures grew pseudomonas and enterococcus. BCx negative. Continued on Vancomycin for 4 days and Zosyn (8/22- 9/1). Multiple discussions between ID, podiatry, patient, and daughter regarding treatment options (surgical vs IV Abx vs PO). Ultimately patient decided on oral abx with Cipro 500 mg daily and Augmentin 500/125 mg BID until 10/2/24    Medication Changes:  Cipro 500 mg daily and Augmentin 500/125 mg BID until 10/2/24    Important follow up:  Infectious Disease  Podiatry     HPI:  83F with PMHx HTN, HLD, CKD, bilateral carpal tunnel syndrome, GERD presenting with L 1st toe pain, drainage x3 weeks. The patient has been following her podiatrist for a L 1st toe wound for 3 weeks. It initially started with malodor and gradually over several weeks the toenail degraded and came off. She believes pus may have drained from it as well. She had an ulceration to the dorsum side of the toe and worsened pain but able to ambulate. She was seen at [facility] on Sunday and discharged with clindamycin TID per patient and she has been taking. Per [facility] documentation, imaging wasn't available and possibly not done and patient was sent to the hospital today for  r/o OM. Denies fevers, chills, cough, CP, SOB, abdominal pain, vomiting, diarrhea, LE edema, LH, dizziness, LOC, hx PAD, ascending erythema, trauma to toe.    In ED given vanc and zosyn. Seen by podiatry. (23 Aug 2024 01:01)    During admission, Xray of the L foot showed likely osteomyelitis. This was followed up with an MRI which showed osteomyelitis of the first digit distal phalanx. Podiatry performed amputation of the distal phalanx. Wound cultures grew pseudomonas and enterococcus. BCx negative. Continued on Vancomycin for 4 days and Zosyn (8/22- 9/1). Multiple discussions between ID, podiatry, patient, and daughter regarding treatment options (surgical vs IV Abx vs PO). 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 The patient has been following her podiatrist for a L 1st toe wound for 3 weeks. It initially started with malodor and gradually over several weeks the toenail degraded and came off. She believes pus may have drained from it as well. She had an ulceration to the dorsum side of the toe and worsened pain but able to ambulate. She was seen at [facility] on Sunday and discharged with clindamycin TID per patient and she has been taking. Per [facility] documentation, imaging wasn't available and possibly not done and patient was sent to the hospital today for  r/o OM. Denies fevers, chills, cough, CP, SOB, abdominal pain, vomiting, diarrhea, LE edema, LH, dizziness, LOC, hx PAD, ascending erythema, trauma to toe.    In ED given vanc and zosyn. Seen by podiatry. (23 Aug 2024 01:01)    During admission, Xray of the L foot showed likely osteomyelitis. This was followed up with an MRI which showed osteomyelitis of the first digit distal phalanx. Podiatry performed amputation of the distal phalanx. Wound cultures grew pseudomonas and enterococcus. BCx negative. Continued on Vancomycin for 4 days and Zosyn (8/22- 9/1). Multiple discussions between ID, podiatry, patient, and daughter regarding treatment options (surgical vs IV Abx vs PO). 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 The patient has been following her podiatrist for a L 1st toe wound for 3 weeks. It initially started with malodor and gradually over several weeks the toenail degraded and came off. She believes pus may have drained from it as well. She had an ulceration to the dorsum side of the toe and worsened pain but able to ambulate. She was seen at [facility] on Sunday and discharged with clindamycin TID per patient and she has been taking. Per [facility] documentation, imaging wasn't available and possibly not done and patient was sent to the hospital today for  r/o OM. Denies fevers, chills, cough, CP, SOB, abdominal pain, vomiting, diarrhea, LE edema, LH, dizziness, LOC, hx PAD, ascending erythema, trauma to toe.    In ED given vanc and zosyn. Seen by podiatry. (23 Aug 2024 01:01)    During admission, Xray of the L foot showed likely osteomyelitis. This was followed up with an MRI which showed osteomyelitis of the first digit distal phalanx. Podiatry performed amputation of the distal phalanx. Wound cultures grew pseudomonas and enterococcus. BCx negative. Continued on Vancomycin for 4 days and Zosyn (8/22- 9/1). Multiple discussions between ID, podiatry, patient, and daughter regarding treatment options (surgical vs IV Abx vs PO). 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

## 2024-08-23 NOTE — CONSULT NOTE ADULT - SUBJECTIVE AND OBJECTIVE BOX
ID INITIAL CONSULT NOTE     Patient is a 83y old  Female who presents with a chief complaint of L 1st toe infection (23 Aug 2024 09:24)      HPI:  83F with PMHx HTN, HLD, CKD, bilateral carpal tunnel syndrome, GERD presenting with L 1st toe pain, drainage x3 weeks. The patient has been following her podiatrist for a L 1st toe wound for 3 weeks. It initially started with malodor and gradually over several weeks the toenail degraded and came off. She believes pus may have drained from it as well. She had an ulceration to the dorsum side of the toe and worsened pain but able to ambulate. She was seen at [facility] on Sunday and discharged with clindamycin TID per patient and she has been taking. Per [facility] documentation, imaging wasn't available and possibly not done and patient was sent to the hospital today for  r/o OM. Denies fevers, chills, cough, CP, SOB, abdominal pain, vomiting, diarrhea, LE edema, LH, dizziness, LOC, hx PAD, ascending erythema, trauma to toe.    In ED given vanc and zosyn. Seen by podiatry. (23 Aug 2024 01:01)      prior hospital charts reviewed [  ]  primary team notes reviewed [ X ]  other consultant notes reviewed [ X ]    PAST MEDICAL & SURGICAL HISTORY:  HTN (hypertension)      HLD (hyperlipidemia)      Chronic kidney disease, unspecified CKD stage      GERD (gastroesophageal reflux disease)      H/O carpal tunnel syndrome      S/P cholecystectomy          Allergies  iodinated radiocontrast agents (Chills)  Prevacid (Chills)        ANTIMICROBIALS:  piperacillin/tazobactam IVPB.. 3.375 every 8 hours  vancomycin  IVPB 1000 every 24 hours      Current Abx:     Past Abx:       OTHER MEDS: MEDICATIONS  (STANDING):  acetaminophen     Tablet .. 650 every 6 hours PRN  amLODIPine   Tablet 7.5 daily  enoxaparin Injectable 40 every 24 hours  hydrochlorothiazide 25 daily  losartan 100 daily  melatonin 3 at bedtime PRN  metoprolol succinate ER 25 at bedtime      SOCIAL HISTORY: [ ] etoh [ ] tobacco [ ] former smoker [ ] IVDU    FAMILY HISTORY:  FH: HTN (hypertension)        REVIEW OF SYSTEMS:    CONSTITUTIONAL: No weakness, fevers or chills  EYES/ENT: No visual changes;  No vertigo or throat pain   NECK: No pain or stiffness  RESPIRATORY: No cough, wheezing, hemoptysis; No shortness of breath  CARDIOVASCULAR: No chest pain or palpitations  GASTROINTESTINAL: No abdominal or epigastric pain. No nausea, vomiting, or hematemesis; No diarrhea or constipation. No melena or hematochezia.  GENITOURINARY: No dysuria, frequency or hematuria  NEUROLOGICAL: No numbness or weakness  SKIN: L foot 1st toe ulcer   All other review of systems is negative unless indicated above.    Vital Signs Last 24 Hrs  T(F): 97.8 (08-23-24 @ 05:15), Max: 98.3 (08-22-24 @ 23:32)    Vital Signs Last 24 Hrs  HR: 56 (08-23-24 @ 05:15) (56 - 76)  BP: 136/77 (08-23-24 @ 05:15) (136/77 - 160/74)  RR: 17 (08-23-24 @ 05:15)  SpO2: 99% (08-23-24 @ 05:15) (97% - 100%)  Wt(kg): --    PHYSICAL EXAM:  GENERAL: NAD, well-developed  HEAD:  Atraumatic, Normocephalic  EYES: EOMI, conjunctiva and sclera clear  CHEST/LUNG: Clear to auscultation bilaterally; No wheeze  HEART: Regular rate and rhythm; No murmurs, rubs, or gallops  ABDOMEN: Soft, Nontender, Nondistended; Bowel sounds present  EXTREMITIES:  L foot nail bed removed, wrapped, dressing c/d/i  PSYCH: AAOx3      Labs:                          11.0   7.14  )-----------( 250      ( 23 Aug 2024 06:18 )             34.1       08-23    139  |  103  |  27<H>  ----------------------------<  98  4.3   |  25  |  1.12    Ca    9.5      23 Aug 2024 06:18  Phos  3.0     08-23  Mg     2.00     08-23    TPro  6.8  /  Alb  3.6  /  TBili  0.4  /  DBili  x   /  AST  24  /  ALT  10  /  AlkPhos  81  08-23      Urinalysis Basic - ( 23 Aug 2024 06:18 )    Color: x / Appearance: x / SG: x / pH: x  Gluc: 98 mg/dL / Ketone: x  / Bili: x / Urobili: x   Blood: x / Protein: x / Nitrite: x   Leuk Esterase: x / RBC: x / WBC x   Sq Epi: x / Non Sq Epi: x / Bacteria: x          MICROBIOLOGY:          v              RADIOLOGY:  < from: Xray Foot AP + Lateral + Oblique, Left (08.22.24 @ 18:14) >  FINDINGS:  Focal soft tissue swelling along the distal first phalanx. Small area of   cortical erosion along the medial aspect of the first phalanx tuft.    No acute fracture. No dislocation. Arthrosis of the tarsometatarsal   articulations. Osteopenia.    Vascular calcifications.    IMPRESSION:  Focal soft tissue swelling of the distal first phalanx with underlying   cortical erosion of the tuft. Findings concerning for osteomyelitis.    < end of copied text >

## 2024-08-23 NOTE — DISCHARGE NOTE PROVIDER - NSDCCPCAREPLAN_GEN_ALL_CORE_FT
PRINCIPAL DISCHARGE DIAGNOSIS  Diagnosis: Osteomyelitis  Assessment and Plan of Treatment:      PRINCIPAL DISCHARGE DIAGNOSIS  Diagnosis: Osteomyelitis  Assessment and Plan of Treatment: You came in with an ulcer and some pus coming from your left foot. We were concerned about an infection in your foot and after you got the MRI of your foot, we noticed that the infection had spread into the bone (osteomyelitis) of your foot. Given this, the treatment with greatest chance of success long term is to surgically remove the infected bone and continue with short term antibiotics.  Other treatments with lower liklihood of success include long term IV antibiotics and long term oral antibiotics.   You chose to proceed with *****. Please continue taking ***** and follow up with Dr. Centeno (infectious disease) in his clinic.  Bacteria growing:   Pseudomonas aeruginosa   Enterococcus avium  Enterococcus faecalis     PRINCIPAL DISCHARGE DIAGNOSIS  Diagnosis: Osteomyelitis  Assessment and Plan of Treatment: You came in with an ulcer and some pus coming from your left foot. We were concerned about an infection in your foot and after you got the MRI of your foot, we noticed that the infection had spread into the bone (osteomyelitis) of your foot. Given this, the treatment with greatest chance of success long term is to surgically remove the infected bone and continue with short term antibiotics.  Other treatments with lower liklihood of success include long term IV antibiotics and long term oral antibiotics.   You chose to proceed with oral antibiotics. Please continue taking  Cipro 500 mg BID and Augmentin 875/125 mg BID until 10/2/24  and follow up with Dr. Centeno (infectious disease) in his clinic.  Bacteria growing:   Pseudomonas aeruginosa   Enterococcus avium  Enterococcus faecalis

## 2024-08-23 NOTE — DISCHARGE NOTE PROVIDER - NSDCCPTREATMENT_GEN_ALL_CORE_FT
PRINCIPAL PROCEDURE  Procedure: MRI left foot  Findings and Treatment:   < end of copied text >  MR FOOT WAW IC LT   ORDERED BY: NEELIMA JEFFERSON   PROCEDURE DATE:  08/23/2024    INTERPRETATION:  EXAMINATION: MR FOOT WITHOUT AND WITH IV CONTRAST LEFT  CLINICAL INDICATION:Evaluate for left hallux osteomyelitis.  COMPARISON: Foot radiographs dated 8/22/24  TECHNIQUE: Multiplanar, multi-sequence MRI of the left foot was performed   without and with intravenous contrast.  INTERPRETATION:  There is T1 hypointense/STIR hyperintense signal abnormality with   enhancement involving the distal phalanx of the first digit with   overlying ulcer, findings consistent osteomyelitis.There is moderate   midfoot arthropathy with scattered subchondral edema present at the   tarsometatarsal joints and talonavicular joint.There is no localized   abscess. There is no acute fracture or AVN. There is edema within the   intrinsic muscles of the foot. There is dorsal foot soft tissue edema.  IMPRESSION:  Osteomyelitis of the first digit distal phalanx.  --- End of Report ---< from: MR Foot w/wo IV Cont, Left (08.23.24 @ 14:24) >

## 2024-08-23 NOTE — H&P ADULT - ASSESSMENT
83F with PMHx HTN, HLD, CKD, bilateral carpal tunnel syndrome, GERD presenting with L 1st toe pain, drainage x3 weeks. Admitted for suspected L 1st toe osteomyelitis

## 2024-08-23 NOTE — DISCHARGE NOTE PROVIDER - ATTENDING DISCHARGE PHYSICAL EXAMINATION:
VITAL SIGNS:  Vital Signs Last 24 Hrs  T(C): 36.6 (01 Sep 2024 05:59), Max: 36.8 (31 Aug 2024 21:20)  T(F): 97.9 (01 Sep 2024 05:59), Max: 98.2 (31 Aug 2024 21:20)  HR: 66 (01 Sep 2024 05:59) (64 - 66)  BP: 131/58 (01 Sep 2024 05:59) (124/65 - 131/58)  BP(mean): --  RR: 17 (01 Sep 2024 05:59) (17 - 18)  SpO2: 100% (01 Sep 2024 05:59) (100% - 100%)    Parameters below as of 01 Sep 2024 05:59  Patient On (Oxygen Delivery Method): room air        PHYSICAL EXAM:    General: NAD  HEENT: MMM  Neck: supple  Cardiovascular: +S1/S2; RRR  Respiratory: CTA B/L; no W/R/R  Gastrointestinal: soft, NT/ND  Extremities: WWP; no edema, clubbing or cyanosis  Neurological: awake and alert; communicating and able to follow commands without appreciated focal neurologic deficits PHYSICAL EXAM:    General: NAD  HEENT: MMM  Neck: supple  Cardiovascular: +S1/S2; RRR  Respiratory: CTA B/L; no W/R/R  Gastrointestinal: soft, NT/ND  Extremities: WWP; no edema, clubbing or cyanosis  Neurological: awake and alert; communicating and able to follow commands without appreciated focal neurologic deficits

## 2024-08-23 NOTE — CONSULT NOTE ADULT - ASSESSMENT
This is an 84 y/o F w/ PMHx HTN, HLD, CKD, b/l CTS, GERD who presents to [facility] on 8/22 for L 1st toe pain w/ drainage for 3 weeks, following w/ outpatient podiatrist, now w/ malodor, pus from ulceration.   Pt recent seen at [facility], discharged w/ Clindamycin, now sent to [facility] to r/o OM by her podiatrist.   In the ER, pt was afebrile, VSS.   Labs w/o leukocytosis, ESR 36, CRP 8.6.   XR of distal 1st phalax w/ OM    #L foot 1st distal phalanx OM w/ purulent drainage    Overall,  84 y/o F w/ PMHx HTN, HLD, CKD, b/l CTS, GERD presenting w/ L foot 1st toe drainage and wound, now w/ 1st phalanx OM. Seen by podiatry, L 1st toe tail removed, wound to bone w/ purulence. Would cover broadly for now and narrow based on Cx data. F/u MRI.     Plan:   1. C/w Vancomycin, f/u pre-3rd trough. Zosyn 3.375 g q8. Will narrow w/ cx   2. F/u wound cx  3. F/u MRI   4. F/u podiatry recommendations.     Thank you for this consult. Inpatient ID team will follow.    Ankur Centeno M.D.  Attending Physician  Division of Infectious Diseases  Department of Medicine    Please contact through [employer].  Office: [phone] (after 5 PM or weekend)      
83F presents for left hallux wound  -Pt was seen and evaluated  -Afebrile, no leukocytosis, ESR 36, CRP 0.8  -Left dorsal hallux wound to bone deep to the nail plate and within the first interspace, nail plate loosening. erythema globally about the first digit, strong malodor, purulent drainage, no crepitus  -Left foot x-ray: no gas, possible Om of the medial aspect of the distal phalanx of the hallux (resident read)  -Left foot wound culture obtained   -After anesthetizing the left hallux with 2% lidocaine plain, the nail plate was removed using sterile suture removal kit. Wound to bone with purulence was identified. Patient tolerated well.   -Recommend admit to medicine  -Recommend vancomycin/zosyn  -Recommend ID consult  -Ordered left foot MRI  -Pod Plan: local wound care pending MRI/ wound culture  -Discussed with attending

## 2024-08-23 NOTE — H&P ADULT - NSHPREVIEWOFSYSTEMS_GEN_ALL_CORE
ROS:    Constitutional: [ ] fevers [ ] chills   HEENT: [ ] postnasal drip [ ] nasal congestion  CV: [ ] chest pain [ ] orthopnea [ ] palpitations [ ] murmur  Resp: [ ] cough [ ] shortness of breath [ ] dyspnea [ ] wheezing   GI: [ ] nausea [ ] vomiting [ ] diarrhea [ ] constipation [ ] abd pain   : [ ] dysuria [ ] nocturia [ ] hematuria [ ] increased urinary frequency  Musculoskeletal: [ ] back pain [ ] myalgias [ ] arthralgias [ ] fracture  Skin: [ ] rash [ ] itch [x] L 1st toe pain and drainage  Neurological: [ ] headache [ ] dizziness [ ] syncope   Endocrine: [ ] diabetes [ ] thyroid problem  Hematologic/Lymphatic: [ ] anemia [ ] bleeding problem  [x ] All other systems negative

## 2024-08-23 NOTE — PATIENT PROFILE ADULT - FALL HARM RISK - HARM RISK INTERVENTIONS
Assistance with ambulation/Assistance OOB with selected safe patient handling equipment/Communicate Risk of Fall with Harm to all staff/Discuss with provider need for PT consult/Monitor for mental status changes/Monitor gait and stability/Provide patient with walking aids - walker, cane, crutches/Reinforce activity limits and safety measures with patient and family/Review medications for side effects contributing to fall risk/Sit up slowly, dangle for a short time, stand at bedside before walking/Tailored Fall Risk Interventions/Use of alarms - bed, chair and/or voice tab/Visual Cue: Yellow wristband and red socks/Bed in lowest position, wheels locked, appropriate side rails in place/Call bell, personal items and telephone in reach/Instruct patient to call for assistance before getting out of bed or chair/Non-slip footwear when patient is out of bed/[place] to call system/Physically safe environment - no spills, clutter or unnecessary equipment/Purposeful Proactive Rounding/Room/bathroom lighting operational, light cord in reach

## 2024-08-23 NOTE — DISCHARGE NOTE PROVIDER - NSFOLLOWUPCLINICS_GEN_ALL_ED_FT
[facility] - Infectious Disease  Infectious Disease  [address], [place] [address]  Phone: [phone]  Fax:

## 2024-08-23 NOTE — DISCHARGE NOTE PROVIDER - NSDCFUADDAPPT_GEN_ALL_CORE_FT
APPTS ARE READY TO BE MADE: [ ] YES    Best Family or Patient Contact (if needed):    Additional Information about above appointments (if needed):    1:   2:   3:     Other comments or requests:    APPTS ARE READY TO BE MADE: [ x] YES    Best Family or Patient Contact (if needed):    Additional Information about above appointments (if needed):    1: Infectious Disease (Dr. Ankur Centeno)  2: Your podiatrist  3: Your PCP    Other comments or requests:    APPTS ARE READY TO BE MADE: [ x] YES    Best Family or Patient Contact (if needed):    Additional Information about above appointments (if needed):    1: Infectious Disease (Dr. Ankur Centeno)  2: Your podiatrist  3: Your PCP    Other comments or requests:   Podiatry Discharge Instructions:  Follow up: Please follow up with Dr. Acosta within 1 week of discharge from the hospital, please call [phone] for appointment and discuss that you recently were seen in the hospital.  Wound Care: Please apply betadine, 4x4 guaze, and ramo to left foot wound daily.   Weight bearing: Please weight bear as tolerated in a surgical shoe.  Antibiotics: Please continue as instructed.   APPTS ARE READY TO BE MADE: [ x] YES    Best Family or Patient Contact (if needed):    Additional Information about above appointments (if needed):    1: Infectious Disease (Dr. Ankur Centeno)  2: Your podiatrist  3: Your PCP    Other comments or requests:   Podiatry Discharge Instructions:  Follow up: Please follow up with Dr. Acosta within 1 week of discharge from the hospital, please call [phone] for appointment and discuss that you recently were seen in the hospital.  Wound Care: Please apply betadine, 4x4 guaze, and ramo to left foot wound daily.   Weight bearing: Please weight bear as tolerated in a surgical shoe.  Antibiotics: Please continue as instructed.        Appointment was scheduled by our team on the patient's behalf through the provider's office for Podiatry with Dr. Acosta for 9/3/2024 at 10:15am, [address], [place] [address].     A [facility] office was contacted to secure an appointment for Infectious Disease, however the office will follow up with the patient/caregiver directly.     Met with the patient face to face, however they advised they prefer to coordinate the care on their own for follow-up appointment with PCP.  APPTS ARE READY TO BE MADE: [ x] YES    Best Family or Patient Contact (if needed):    Additional Information about above appointments (if needed):    1: Infectious Disease (Dr. Ankur Centeno)  2: Your podiatrist  3: Your PCP    Other comments or requests:   Podiatry Discharge Instructions:  Follow up: Please follow up with Dr. Acosta within 1 week of discharge from the hospital, please call [phone] for appointment and discuss that you recently were seen in the hospital.  Wound Care: Please apply betadine, 4x4 guaze, and ramo to left foot wound daily.   Weight bearing: Please weight bear as tolerated in a surgical shoe.  Antibiotics: Please continue as instructed.        Appointment was scheduled by our team on the patient's behalf through the provider's office for Podiatry with Dr. Acosta for 9/3/2024 at 10:15am, [address], [place] [address].     A [facility] providers office was contacted to secure an appointment for Infectious Disease with Dr. Ankur Centeno, however the office will follow up with the patient/caregiver directly.     Met with the patient face to face, however they advised they prefer to coordinate the care on their own for follow-up appointment with PCP.  APPTS ARE READY TO BE MADE: [x] YES    Best Family or Patient Contact (if needed):    Additional Information about above appointments (if needed):    1: Infectious Disease (Dr. Ankur Centeno)  2: Your podiatrist  3: Your PCP    Other comments or requests:   Podiatry Discharge Instructions:  Follow up: Please follow up with Dr. Acosta within 1 week of discharge from the hospital, please call [phone] for appointment and discuss that you recently were seen in the hospital.  Wound Care: Please apply betadine, 4x4 guaze, and ramo to left foot wound daily.   Weight bearing: Please weight bear as tolerated in a surgical shoe.  Antibiotics: Please continue as instructed.        Appointment was scheduled by our team on the patient's behalf through the provider's office for Podiatry with Dr. Acosta for 9/3/2024 at 10:15am, [address], [place] [address].     A [facility] providers office was contacted to secure an appointment for Infectious Disease with Dr. Ankur Centeno, however the office will follow up with the patient/caregiver directly.     Met with the patient face to face, however they advised they prefer to coordinate the care on their own for follow-up appointment with PCP.

## 2024-08-23 NOTE — PROGRESS NOTE ADULT - SUBJECTIVE AND OBJECTIVE BOX
JOVITA CRAIG ([id number])- Patient is a 83y old  Female who presents with a chief complaint of L 1st toe infection (23 Aug 2024 01:01)      INTERVAL HPI/OVERNIGHT EVENTS:       SUBJECTIVE:     PHYSICAL EXAM:  - GENERAL: Follows conversation appropriately. No acute distress.  - EYES: Tracks me in room.   - HENT: Moist mucous membranes. No scleral icterus.   - LUNGS: Clear to auscultation bilaterally. No accessory muscle use.  - CARDIOVASCULAR: Regular rate and rhythm. No murmur.  - ABDOMEN: Soft, non-tender and non-distended. No palpable masses.  - EXTREMITIES: No edema. Non-tender.  - SKIN: No rashes or lesions. Warm.  - NEUROLOGIC: No focal neurological deficits. CN II-XII grossly intact, but not individually tested.  - PSYCHIATRIC: Cooperative. Appropriate mood and affect.    Vital Signs Last 24 Hrs  T(C): 36.6 (23 Aug 2024 05:15), Max: 36.8 (22 Aug 2024 23:32)  T(F): 97.8 (23 Aug 2024 05:15), Max: 98.3 (22 Aug 2024 23:32)  HR: 56 (23 Aug 2024 05:15) (56 - 76)  BP: 136/77 (23 Aug 2024 05:15) (136/77 - 160/74)  BP(mean): --  RR: 17 (23 Aug 2024 05:15) (16 - 17)  SpO2: 99% (23 Aug 2024 05:15) (97% - 100%)    Parameters below as of 23 Aug 2024 05:15  Patient On (Oxygen Delivery Method): room air        LABS:                          11.0   7.14  )-----------( 250      ( 23 Aug 2024 06:18 )             34.1     08-23    139  |  103  |  27<H>  ----------------------------<  98  4.3   |  25  |  1.12    Ca    9.5      23 Aug 2024 06:18  Phos  3.0     08-23  Mg     2.00     08-23    TPro  6.8  /  Alb  3.6  /  TBili  0.4  /  DBili  x   /  AST  24  /  ALT  10  /  AlkPhos  81  08-23          Urinalysis Basic - ( 23 Aug 2024 06:18 )    Color: x / Appearance: x / SG: x / pH: x  Gluc: 98 mg/dL / Ketone: x  / Bili: x / Urobili: x   Blood: x / Protein: x / Nitrite: x   Leuk Esterase: x / RBC: x / WBC x   Sq Epi: x / Non Sq Epi: x / Bacteria: x      PT/INR - ( 23 Aug 2024 06:18 )   PT: 10.9 sec;   INR: 0.97 ratio         PTT - ( 23 Aug 2024 06:18 )  PTT:34.6 sec  Lactate Trend        CAPILLARY BLOOD GLUCOSE          Medications:  acetaminophen     Tablet .. 650 milliGRAM(s) Oral every 6 hours PRN  amLODIPine   Tablet 7.5 milliGRAM(s) Oral daily  enoxaparin Injectable 40 milliGRAM(s) SubCutaneous every 24 hours  hydrochlorothiazide 25 milliGRAM(s) Oral daily  losartan 100 milliGRAM(s) Oral daily  melatonin 3 milliGRAM(s) Oral at bedtime PRN  metoprolol succinate ER 25 milliGRAM(s) Oral at bedtime  piperacillin/tazobactam IVPB.. 3.375 Gram(s) IV Intermittent every 8 hours  vancomycin  IVPB 1000 milliGRAM(s) IV Intermittent every 24 hours      RADIOLOGY & ADDITIONAL TESTS were viewed by me personally.   EKG:    JOVITA CRAIG ([id number])- Patient is a 83y old  Female who presents with a chief complaint of L 1st toe infection (23 Aug 2024 01:01)      INTERVAL HPI/OVERNIGHT EVENTS: Pt admitted overnight.       SUBJECTIVE: No acute overnight events. Denies fever, chills, chest pain, SOB. Pain in L first toe controlled with tylenol.     PHYSICAL EXAM:  - GENERAL: Follows conversation appropriately. No acute distress.  - EYES: Tracks me in room.   - HENT: Moist mucous membranes. No scleral icterus.   - LUNGS: Clear to auscultation bilaterally. No accessory muscle use.  - CARDIOVASCULAR: Regular rate and rhythm. No murmur.  - ABDOMEN: Soft, non-tender and non-distended. No palpable masses.  - EXTREMITIES: No edema. Non-tender.  - SKIN: Warm. s/p nail plate removal of L first toe. No pus/drainage  - NEUROLOGIC: No focal neurological deficits apprecaited  - PSYCHIATRIC: Cooperative. Appropriate mood and affect.      VS  T(C): 36.6 (08-23-24 @ 05:15), Max: 36.8 (08-22-24 @ 23:32)  HR: 56 (08-23-24 @ 05:15) (56 - 76)  BP: 136/77 (08-23-24 @ 05:15) (136/77 - 160/74)  RR: 17 (08-23-24 @ 05:15) (16 - 17)  SpO2: 99% (08-23-24 @ 05:15) (97% - 100%)    LABS (available at time of writing 08-23-24 @ 09:50):                         11.0   7.14  )-----------( 250      ( 23 Aug 2024 06:18 )             34.1     08-23    139  |  103  |  27<H>  ----------------------------<  98  4.3   |  25  |  1.12    Ca    9.5      23 Aug 2024 06:18  Phos  3.0     08-23  Mg     2.00     08-23    TPro  6.8  /  Alb  3.6  /  TBili  0.4  /  DBili  x   /  AST  24  /  ALT  10  /  AlkPhos  81  08-23    PT/INR - ( 23 Aug 2024 06:18 )   PT: 10.9 sec;   INR: 0.97 ratio         PTT - ( 23 Aug 2024 06:18 )  PTT:34.6 sec  Urinalysis Basic - ( 23 Aug 2024 06:18 )    Color: x / Appearance: x / SG: x / pH: x  Gluc: 98 mg/dL / Ketone: x  / Bili: x / Urobili: x   Blood: x / Protein: x / Nitrite: x   Leuk Esterase: x / RBC: x / WBC x   Sq Epi: x / Non Sq Epi: x / Bacteria: x            Medications:   acetaminophen     Tablet .. 650 milliGRAM(s) Oral every 6 hours PRN  amLODIPine   Tablet 7.5 milliGRAM(s) Oral daily  enoxaparin Injectable 40 milliGRAM(s) SubCutaneous every 24 hours  hydrochlorothiazide 25 milliGRAM(s) Oral daily  losartan 100 milliGRAM(s) Oral daily  melatonin 3 milliGRAM(s) Oral at bedtime PRN  metoprolol succinate ER 25 milliGRAM(s) Oral at bedtime  piperacillin/tazobactam IVPB.. 3.375 Gram(s) IV Intermittent every 8 hours  vancomycin  IVPB 1000 milliGRAM(s) IV Intermittent every 24 hours      RADIOLOGY, EKG & ADDITIONAL TESTS: Reviewed.

## 2024-08-23 NOTE — PROGRESS NOTE ADULT - ASSESSMENT
83F presents for left hallux wound  -Pt was seen and evaluated  -Afebrile, no leukocytosis, ESR 36, CRP 0.8  -Left dorsal hallux wound to bone deep to the nail plate and within the first interspace, nail plate removed, erythema globally about the first digit, moderate malodor, mild purulent drainage, no crepitus  -Left foot x-ray: Focal soft tissue swelling of the distal first phalanx with underlying cortical erosion of the tuft. Findings concerning for osteomyelitis.  -Left foot wound culture pending  -Continue vancomycin/zosyn  -Recommend ID consult  -Left foot MRI pending  -Pod Plan: local wound care vs left foot partial 1st ray resection pending MRI/ wound culture  -Seen with attending

## 2024-08-23 NOTE — H&P ADULT - PROBLEM SELECTOR PLAN 1
L 1st toe infection - pain, drainage, mild erythema. Xray: Focal soft tissue swelling along the distal first phalanx.  Cortical erosion along the medial aspect of the first distal phalanx, concerning for osteomyelitis.  ESR 36, CRP 8.6  Seen by podiatry - nail plate removed. Wound to bone with purulence identified  -vanc, zosyn  -MRI L foot with contrast. Patient notes hx of chills with dye contrast in the past but does not believe it was for an MRI. Never had an anaphylactic reaction. No SOB, hypotension, rash.  -ID c/s in AM  -has 2+ DP pulses bilaterally. Warm and well perfused. Do not suspect PAD. Will obtain KIMBERLY/PVR and if abnormal consult vascular surgery L 1st toe infection - pain, drainage, mild erythema. Xray: Focal soft tissue swelling along the distal first phalanx.  Cortical erosion along the medial aspect of the first distal phalanx, concerning for osteomyelitis.  ESR 36, CRP 8.6  Seen by podiatry - nail plate removed. Wound to bone with purulence identified  -vanc, zosyn  -MRI L foot with contrast. Patient notes hx of chills with dye contrast in the past but does not believe it was for an MRI. Never had an anaphylactic reaction. No SOB, hypotension, rash.  -ID c/s in AM  -has 2+ DP pulses bilaterally. Warm and well perfused. Low suspicion for PAD based off exam but will obtain KIMBERLY/PVR and if abnormal consult vascular surgery  -f/u BCx x2, Wound culture. Not septic

## 2024-08-23 NOTE — DISCHARGE NOTE PROVIDER - CARE PROVIDER_API CALL
Sole Bailon  [address] [place] [address]  Phone: [phone]  Fax: [phone]  Established Patient  Follow Up Time: 2 weeks

## 2024-08-23 NOTE — H&P ADULT - NSHPPHYSICALEXAM_GEN_ALL_CORE
PHYSICAL EXAM:  Vital Signs Last 24 Hrs  T(C): 36.4 (08-22-24 @ 23:45)  T(F): 97.6 (08-22-24 @ 23:45), Max: 98.3 (08-22-24 @ 23:32)  HR: 76 (08-22-24 @ 23:45) (58 - 76)  BP: 152/77 (08-22-24 @ 23:45)  BP(mean): --  RR: 17 (08-22-24 @ 23:45) (16 - 17)  SpO2: 97% (08-22-24 @ 23:45) (97% - 100%)  Wt(kg): --    Constitutional: NAD, awake and alert, well developed  EYES: EOMI, conjunctiva clear  ENT:  Normal Hearing, no tonsillar exudates   Neck: Soft and supple , no thyromegaly   Respiratory: Breath sounds are clear bilaterally, No wheezing, rales or rhonchi, no tachypnea, no accessory muscle use  Cardiovascular: S1 and S2, regular rate and rhythm, no Murmurs, gallops or rubs, no JVD, no leg edema  Gastrointestinal: Bowel Sounds present, soft, nontender, nondistended, no guarding, no rebound  Extremities: No cyanosis or clubbing; warm to touch  Vascular: 2+ DP pulses bilaterally, warm and well perfused  Neurological: No focal deficits, CN II-XII intact bilaterally, sensation to light touch intact in all extremities.   Musculoskeletal: 5/5 strength b/l upper and lower extremities; no joint swelling.  Skin: L 1st toe distal ulceration, malodor. Mild surrounding erythema. TTP. No active drainage

## 2024-08-23 NOTE — PROGRESS NOTE ADULT - PROBLEM SELECTOR PLAN 1
L 1st toe infection - pain, drainage, mild erythema. Xray: Focal soft tissue swelling along the distal first phalanx.  Cortical erosion along the medial aspect of the first distal phalanx, concerning for osteomyelitis.  ESR 36, CRP 8.6  Seen by podiatry - nail plate removed. Wound to bone with purulence identified  -vanc, zosyn  -MRI L foot with contrast. Patient notes hx of chills with dye contrast in the past but does not believe it was for an MRI. Never had an anaphylactic reaction. No SOB, hypotension, rash.  -ID c/s in AM  -has 2+ DP pulses bilaterally. Warm and well perfused. Low suspicion for PAD based off exam but will obtain KIMBERLY/PVR and if abnormal consult vascular surgery  -f/u BCx x2, Wound culture. Not septic - L 1st toe infection - pain, drainage, mild erythema. Xray: Focal soft tissue swelling along the distal first phalanx.  Cortical erosion along the medial aspect of the first distal phalanx, concerning for osteomyelitis.  - ESR 36, CRP 8.6  - Seen by podiatry - nail plate removed. Wound to bone with purulence identified  -  > s/p vanc, zosyn  > MRI L foot with contrast. Patient notes hx of chills with dye contrast in the past but does not believe it was for an MRI. Never had an anaphylactic reaction. No SOB, hypotension, rash.  > ID consult for abx management  > f/u BCx x2, Wound culture. Not septic - L 1st toe infection - pain, drainage, mild erythema. Xray: Focal soft tissue swelling along the distal first phalanx.  Cortical erosion along the medial aspect of the first distal phalanx, concerning for osteomyelitis.  - ESR 36, CRP 8.6  - Seen by podiatry - nail plate removed. Wound to bone with purulence identified    > cw vanc, zosyn  > MRI L foot with contrast. Patient notes hx of chills with dye contrast when getting a CT in past. Never had MRI w contrast in past. Has had MRI Head but   > ID consult for abx management  > f/u BCx x2, Wound culture

## 2024-08-23 NOTE — H&P ADULT - PROBLEM SELECTOR PLAN 2
Resume losartan/HCTZ 100-25mg qd, toprol 25mg qhs, amlodipine 7.5mg qd Resume losartan/HCTZ 100-25mg qd, metoprolol succinate 25mg qhs, amlodipine 7.5mg qd

## 2024-08-23 NOTE — PROGRESS NOTE ADULT - ASSESSMENT
This is an 82 y/o F w/ PMHx HTN, HLD, CKD, b/l CTS, GERD who presents to [facility] on 8/22 for L 1st toe pain w/ drainage for 3 weeks, following w/ outpatient podiatrist, now w/ malodor, pus from ulceration.   Pt recent seen at [facility], discharged w/ Clindamycin, now sent to [facility] to r/o OM by her podiatrist.   In the ER, pt was afebrile, VSS.   Labs w/o leukocytosis, ESR 36, CRP 8.6.   XR of distal 1st phalax w/ OM    #L foot 1st distal phalanx OM w/ purulent drainage    Overall,  82 y/o F w/ PMHx HTN, HLD, CKD, b/l CTS, GERD presenting w/ L foot 1st toe drainage and wound, now w/ 1st phalanx OM. Seen by podiatry, L 1st toe tail removed, wound to bone w/ purulence. Would cover broadly for now and narrow based on Cx data. F/u MRI.     Plan:   1. C/w Vancomycin, f/u pre-3rd trough. Zosyn 3.375 g q8. Will narrow w/ cx   2. F/u wound cx  3. F/u MRI   4. F/u podiatry recommendations.     Thank you for this consult. Inpatient ID team will follow.    Ankur Centeno M.D.  Attending Physician  Division of Infectious Diseases  Department of Medicine    Please contact through [employer].  Office: [phone] (after 5 PM or weekend)

## 2024-08-23 NOTE — H&P ADULT - NSHPLABSRESULTS_GEN_ALL_CORE
Personally reviewed labs:                        11.0   6.41  )-----------( 271      ( 22 Aug 2024 17:37 )             34.6     08-22-24 @ 17:37    142  |  104  |  28<H>             --------------------------< 93     4.2  |  25  | 0.97    eGFR AA: --  eGFR N-AA: --    Calcium: 10.0  Phosphorus: --  Magnesium: --    AST: 27    ALT: 13  AlkPhos: 87  Protein: 7.7  Albumin: 4.0  TBili: 0.3  D-Bili: --          RADIOLOGY & ADDITIONAL TESTS:    Imaging personally reviewed:  Xray L foot:  INTERPRETATION:  Preliminary  impression:  Focal soft tissue swelling along the distal first phalanx.  Cortical erosion along the medial aspect of the first distal phalanx,   concerning for osteomyelitis.    LLE duplex:  IMPRESSION:  No evidence of left lower extremity deep venous thrombosis.

## 2024-08-23 NOTE — H&P ADULT - NSICDXPASTMEDICALHX_GEN_ALL_CORE_FT
PAST MEDICAL HISTORY:  Chronic kidney disease, unspecified CKD stage     GERD (gastroesophageal reflux disease)     H/O carpal tunnel syndrome     HLD (hyperlipidemia)     HTN (hypertension)

## 2024-08-23 NOTE — H&P ADULT - HISTORY OF PRESENT ILLNESS
83F with PMHx HTN, HLD, CKD, bilateral carpal tunnel syndrome, GERD presenting with L 1st toe pain, drainage x3 weeks. The patient has been following her podiatrist for a L 1st toe wound for 3 weeks. It initially started with malodor and gradually over several weeks the toenail degraded and came off. She believes pus may have drained from it as well. She had an ulceration to the dorsum side of the toe and worsened pain but able to ambulate. She was seen at [facility] on Sunday and discharged with clindamycin TID per patient and she has been taking. Per [facility] documentation, imaging wasn't available and possibly not done and patient was sent to the hospital today for  r/o OM. Denies fevers, chills, cough, CP, SOB, abdominal pain, vomiting, diarrhea, LE edema, LH, dizziness, LOC, hx PAD, ascending erythema, trauma to toe.    In ED given vanc and zosyn. Seen by podiatry.

## 2024-08-23 NOTE — PROGRESS NOTE ADULT - SUBJECTIVE AND OBJECTIVE BOX
Patient is a 83y old  Female who presents with a chief complaint of L 1st toe infection (23 Aug 2024 07:57)    HPI: 83F with PMHx HTN, HLD, CKD, bilateral carpal tunnel syndrome, GERD presenting with L 1st toe pain, drainage x3 weeks. The patient has been following her podiatrist for a L 1st toe wound for 3 weeks. It initially started with malodor and gradually over several weeks the toenail degraded and came off. She believes pus may have drained from it as well. She had an ulceration to the dorsum side of the toe and worsened pain but able to ambulate. She was seen at [facility] on Sunday and discharged with clindamycin TID per patient and she has been taking. Per [facility] documentation, imaging wasn't available and possibly not done and patient was sent to the hospital to r/o OM. Denies fevers, chills, cough, CP, SOB, abdominal pain, vomiting, diarrhea, LE edema, LH, dizziness, LOC, hx PAD, ascending erythema, trauma to toe.    INTERVAL HPI/OVERNIGHT EVENTS:  Patient seen and evaluated at bedside.  Pt is resting comfortable in NAD. Denies N/V/F/C.      Allergies    iodinated radiocontrast agents (Chills)  Prevacid (Chills)    Intolerances        Vital Signs Last 24 Hrs  T(C): 36.6 (23 Aug 2024 05:15), Max: 36.8 (22 Aug 2024 23:32)  T(F): 97.8 (23 Aug 2024 05:15), Max: 98.3 (22 Aug 2024 23:32)  HR: 56 (23 Aug 2024 05:15) (56 - 76)  BP: 136/77 (23 Aug 2024 05:15) (136/77 - 160/74)  BP(mean): --  RR: 17 (23 Aug 2024 05:15) (16 - 17)  SpO2: 99% (23 Aug 2024 05:15) (97% - 100%)    Parameters below as of 23 Aug 2024 05:15  Patient On (Oxygen Delivery Method): room air        LABS:                        11.0   7.14  )-----------( 250      ( 23 Aug 2024 06:18 )             34.1     08-23    139  |  103  |  27<H>  ----------------------------<  98  4.3   |  25  |  1.12    Ca    9.5      23 Aug 2024 06:18  Phos  3.0     08-23  Mg     2.00     08-23    TPro  6.8  /  Alb  3.6  /  TBili  0.4  /  DBili  x   /  AST  24  /  ALT  10  /  AlkPhos  81  08-23    PT/INR - ( 23 Aug 2024 06:18 )   PT: 10.9 sec;   INR: 0.97 ratio         PTT - ( 23 Aug 2024 06:18 )  PTT:34.6 sec  Urinalysis Basic - ( 23 Aug 2024 06:18 )    Color: x / Appearance: x / SG: x / pH: x  Gluc: 98 mg/dL / Ketone: x  / Bili: x / Urobili: x   Blood: x / Protein: x / Nitrite: x   Leuk Esterase: x / RBC: x / WBC x   Sq Epi: x / Non Sq Epi: x / Bacteria: x      CAPILLARY BLOOD GLUCOSE          Lower Extremity Physical Exam:  Vascular: DP/PT 2/4, B/L, CFT < 3 seconds B/L, Temperature gradient warm to cool, B/L.   Neuro: Epicritic sensation intact to the level of, B/L.  Musculoskeletal/Ortho: unremarkable  Skin: left dorsal hallux wound to bone deep to the nail plate and within the first interspace, nail plate loosening. erythema globally about the first digit, strong malodor, purulent drainage, no crepitus      RADIOLOGY & ADDITIONAL TESTS:      ACC: [id number] EXAM:  XR FOOT COMP MIN 3 VIEWS LT   ORDERED BY: BENJAMIN NG     PROCEDURE DATE:  08/22/2024          INTERPRETATION:  CLINICAL INDICATION: Infection of the left great toe,   concern for osteomyelitis.  TECHNIQUE: 3 views of the left foot.    COMPARISON: None available.    FINDINGS:  Focal soft tissue swelling along the distal first phalanx. Small area of   cortical erosion along the medial aspect of the first phalanx tuft.    No acute fracture. No dislocation. Arthrosis of the tarsometatarsal   articulations. Osteopenia.    Vascular calcifications.    IMPRESSION:  Focal soft tissue swelling of the distal first phalanx with underlying   cortical erosion of the tuft. Findings concerning for osteomyelitis.    --- End of Report ---          MAGDALENA KOEHLER MD; Resident Radiologist  This document has been electronically signed.  WATSON LUNSFORD MD; Attending Radiologist  This document has been electronically signed. Aug 23 2024  8:12AM

## 2024-08-23 NOTE — PROGRESS NOTE ADULT - SUBJECTIVE AND OBJECTIVE BOX
ID INITIAL CONSULT NOTE     Patient is a 83y old  Female who presents with a chief complaint of L 1st toe infection (23 Aug 2024 09:24)      HPI:  83F with PMHx HTN, HLD, CKD, bilateral carpal tunnel syndrome, GERD presenting with L 1st toe pain, drainage x3 weeks. The patient has been following her podiatrist for a L 1st toe wound for 3 weeks. It initially started with malodor and gradually over several weeks the toenail degraded and came off. She believes pus may have drained from it as well. She had an ulceration to the dorsum side of the toe and worsened pain but able to ambulate. She was seen at [facility] on Sunday and discharged with clindamycin TID per patient and she has been taking. Per [facility] documentation, imaging wasn't available and possibly not done and patient was sent to the hospital today for  r/o OM. Denies fevers, chills, cough, CP, SOB, abdominal pain, vomiting, diarrhea, LE edema, LH, dizziness, LOC, hx PAD, ascending erythema, trauma to toe.    In ED given vanc and zosyn. Seen by podiatry. (23 Aug 2024 01:01)      prior hospital charts reviewed [  ]  primary team notes reviewed [ X ]  other consultant notes reviewed [ X ]    PAST MEDICAL & SURGICAL HISTORY:  HTN (hypertension)      HLD (hyperlipidemia)      Chronic kidney disease, unspecified CKD stage      GERD (gastroesophageal reflux disease)      H/O carpal tunnel syndrome      S/P cholecystectomy          Allergies  iodinated radiocontrast agents (Chills)  Prevacid (Chills)        ANTIMICROBIALS:  piperacillin/tazobactam IVPB.. 3.375 every 8 hours  vancomycin  IVPB 1000 every 24 hours      Current Abx:     Past Abx:       OTHER MEDS: MEDICATIONS  (STANDING):  acetaminophen     Tablet .. 650 every 6 hours PRN  amLODIPine   Tablet 7.5 daily  enoxaparin Injectable 40 every 24 hours  hydrochlorothiazide 25 daily  losartan 100 daily  melatonin 3 at bedtime PRN  metoprolol succinate ER 25 at bedtime      SOCIAL HISTORY: [ ] etoh [ ] tobacco [ ] former smoker [ ] IVDU    FAMILY HISTORY:  FH: HTN (hypertension)        REVIEW OF SYSTEMS:    CONSTITUTIONAL: No weakness, fevers or chills  EYES/ENT: No visual changes;  No vertigo or throat pain   NECK: No pain or stiffness  RESPIRATORY: No cough, wheezing, hemoptysis; No shortness of breath  CARDIOVASCULAR: No chest pain or palpitations  GASTROINTESTINAL: No abdominal or epigastric pain. No nausea, vomiting, or hematemesis; No diarrhea or constipation. No melena or hematochezia.  GENITOURINARY: No dysuria, frequency or hematuria  NEUROLOGICAL: No numbness or weakness  SKIN: L foot 1st toe ulcer   All other review of systems is negative unless indicated above.    Vital Signs Last 24 Hrs  T(F): 97.8 (08-23-24 @ 05:15), Max: 98.3 (08-22-24 @ 23:32)    Vital Signs Last 24 Hrs  HR: 56 (08-23-24 @ 05:15) (56 - 76)  BP: 136/77 (08-23-24 @ 05:15) (136/77 - 160/74)  RR: 17 (08-23-24 @ 05:15)  SpO2: 99% (08-23-24 @ 05:15) (97% - 100%)  Wt(kg): --    PHYSICAL EXAM:  GENERAL: NAD, well-developed  HEAD:  Atraumatic, Normocephalic  EYES: EOMI, conjunctiva and sclera clear  CHEST/LUNG: Clear to auscultation bilaterally; No wheeze  HEART: Regular rate and rhythm; No murmurs, rubs, or gallops  ABDOMEN: Soft, Nontender, Nondistended; Bowel sounds present  EXTREMITIES:  L foot nail bed removed, wrapped, dressing c/d/i  PSYCH: AAOx3      Labs:                          11.0   7.14  )-----------( 250      ( 23 Aug 2024 06:18 )             34.1       08-23    139  |  103  |  27<H>  ----------------------------<  98  4.3   |  25  |  1.12    Ca    9.5      23 Aug 2024 06:18  Phos  3.0     08-23  Mg     2.00     08-23    TPro  6.8  /  Alb  3.6  /  TBili  0.4  /  DBili  x   /  AST  24  /  ALT  10  /  AlkPhos  81  08-23      Urinalysis Basic - ( 23 Aug 2024 06:18 )    Color: x / Appearance: x / SG: x / pH: x  Gluc: 98 mg/dL / Ketone: x  / Bili: x / Urobili: x   Blood: x / Protein: x / Nitrite: x   Leuk Esterase: x / RBC: x / WBC x   Sq Epi: x / Non Sq Epi: x / Bacteria: x          MICROBIOLOGY:          v              RADIOLOGY:  < from: Xray Foot AP + Lateral + Oblique, Left (08.22.24 @ 18:14) >  FINDINGS:  Focal soft tissue swelling along the distal first phalanx. Small area of   cortical erosion along the medial aspect of the first phalanx tuft.    No acute fracture. No dislocation. Arthrosis of the tarsometatarsal   articulations. Osteopenia.    Vascular calcifications.    IMPRESSION:  Focal soft tissue swelling of the distal first phalanx with underlying   cortical erosion of the tuft. Findings concerning for osteomyelitis.    < end of copied text >

## 2024-08-23 NOTE — DISCHARGE NOTE PROVIDER - NSDCMRMEDTOKEN_GEN_ALL_CORE_FT
amLODIPine 2.5 mg oral tablet: 1 tab(s) orally once a day take with 5mg tablet for 7.5 total  amLODIPine 5 mg oral tablet: 1 tab(s) orally once a day  losartan-hydroCHLOROthiazide 100 mg-25 mg oral tablet: 1 tab(s) orally once a day  Metoprolol Succinate ER 25 mg oral tablet, extended release: 1 tab(s) orally once a day (at bedtime)  Potassium Chloride (Eqv-Klor-Con M20) 20 mEq oral tablet, extended release: 1 tab(s) orally 2 times a day   amLODIPine 2.5 mg oral tablet: 1 tab(s) orally once a day take with 5mg tablet for 7.5 total  amLODIPine 5 mg oral tablet: 1 tab(s) orally once a day  amoxicillin-clavulanate 875 mg-125 mg oral tablet: 1 tab(s) orally 2 times a day Please take Augmentin 875/125 mg twice daily through 10/1/24  ciprofloxacin 500 mg oral tablet: 1 tab(s) orally 2 times a day Cipro 500 mg twice daily through 10/1/24  losartan-hydroCHLOROthiazide 100 mg-25 mg oral tablet: 1 tab(s) orally once a day  Metoprolol Succinate ER 25 mg oral tablet, extended release: 1 tab(s) orally once a day (at bedtime)  Potassium Chloride (Eqv-Klor-Con M20) 20 mEq oral tablet, extended release: 1 tab(s) orally 2 times a day   amLODIPine 2.5 mg oral tablet: 1 tab(s) orally once a day take with 5mg tablet for 7.5 total  amLODIPine 5 mg oral tablet: 1 tab(s) orally once a day  Augmentin 500 mg-125 mg oral tablet: 1 tab(s) orally 2 times a day Please take 1 tablet twice a day through 10/1/24  ciprofloxacin 500 mg oral tablet: 1 tab(s) orally once a day Please take one tablet once a day through 10/1/24  losartan-hydroCHLOROthiazide 100 mg-25 mg oral tablet: 1 tab(s) orally once a day  Metoprolol Succinate ER 25 mg oral tablet, extended release: 1 tab(s) orally once a day (at bedtime)  Potassium Chloride (Eqv-Klor-Con M20) 20 mEq oral tablet, extended release: 1 tab(s) orally 2 times a day   amLODIPine 2.5 mg oral tablet: 1 tab(s) orally once a day take with 5mg tablet for 7.5 total  amLODIPine 5 mg oral tablet: 1 tab(s) orally once a day  amoxicillin-clavulanate 500 mg-125 mg oral tablet: 1 tab(s) orally 2 times a day Please take Augmentin twice a day through 10/1/24  ciprofloxacin 500 mg oral tablet: 1 tab(s) orally once a day Please take Ciprofloxacin once a day through 10/1/24  losartan-hydroCHLOROthiazide 100 mg-25 mg oral tablet: 1 tab(s) orally once a day  Metoprolol Succinate ER 25 mg oral tablet, extended release: 1 tab(s) orally once a day (at bedtime)  Potassium Chloride (Eqv-Klor-Con M20) 20 mEq oral tablet, extended release: 1 tab(s) orally 2 times a day   amLODIPine 2.5 mg oral tablet: 1 tab(s) orally once a day take with 5mg tablet for 7.5 total  amLODIPine 5 mg oral tablet: 1 tab(s) orally once a day  amoxicillin-clavulanate 875 mg-125 mg oral tablet: 875 milligram(s) orally 2 times a day Please start on 9/2 PM  ciprofloxacin 500 mg/5 mL oral liquid: 5 milliliter(s) orally 2 times a day Please take from 9/2 PM to 10/2  losartan-hydroCHLOROthiazide 100 mg-25 mg oral tablet: 1 tab(s) orally once a day  Metoprolol Succinate ER 25 mg oral tablet, extended release: 1 tab(s) orally once a day (at bedtime)  Potassium Chloride (Eqv-Klor-Con M20) 20 mEq oral tablet, extended release: 1 tab(s) orally 2 times a day

## 2024-08-24 LAB
ANION GAP SERPL CALC-SCNC: 12 MMOL/L — SIGNIFICANT CHANGE UP (ref 7–14)
BUN SERPL-MCNC: 26 MG/DL — HIGH (ref 7–23)
CALCIUM SERPL-MCNC: 9.2 MG/DL — SIGNIFICANT CHANGE UP (ref 8.4–10.5)
CHLORIDE SERPL-SCNC: 103 MMOL/L — SIGNIFICANT CHANGE UP (ref 98–107)
CO2 SERPL-SCNC: 24 MMOL/L — SIGNIFICANT CHANGE UP (ref 22–31)
CREAT SERPL-MCNC: 1.06 MG/DL — SIGNIFICANT CHANGE UP (ref 0.5–1.3)
EGFR: 51 ML/MIN/1.73M2 — LOW
GLUCOSE SERPL-MCNC: 96 MG/DL — SIGNIFICANT CHANGE UP (ref 70–99)
HCT VFR BLD CALC: 32.6 % — LOW (ref 34.5–45)
HGB BLD-MCNC: 10.6 G/DL — LOW (ref 11.5–15.5)
MCHC RBC-ENTMCNC: 29.5 PG — SIGNIFICANT CHANGE UP (ref 27–34)
MCHC RBC-ENTMCNC: 32.5 GM/DL — SIGNIFICANT CHANGE UP (ref 32–36)
MCV RBC AUTO: 90.8 FL — SIGNIFICANT CHANGE UP (ref 80–100)
NRBC # BLD: 0 /100 WBCS — SIGNIFICANT CHANGE UP (ref 0–0)
NRBC # FLD: 0 K/UL — SIGNIFICANT CHANGE UP (ref 0–0)
PLATELET # BLD AUTO: 237 K/UL — SIGNIFICANT CHANGE UP (ref 150–400)
POTASSIUM SERPL-MCNC: 3.8 MMOL/L — SIGNIFICANT CHANGE UP (ref 3.5–5.3)
POTASSIUM SERPL-SCNC: 3.8 MMOL/L — SIGNIFICANT CHANGE UP (ref 3.5–5.3)
RBC # BLD: 3.59 M/UL — LOW (ref 3.8–5.2)
RBC # FLD: 14.2 % — SIGNIFICANT CHANGE UP (ref 10.3–14.5)
SODIUM SERPL-SCNC: 139 MMOL/L — SIGNIFICANT CHANGE UP (ref 135–145)
VANCOMYCIN TROUGH SERPL-MCNC: 7.8 UG/ML — LOW (ref 10–20)
WBC # BLD: 5.93 K/UL — SIGNIFICANT CHANGE UP (ref 3.8–10.5)
WBC # FLD AUTO: 5.93 K/UL — SIGNIFICANT CHANGE UP (ref 3.8–10.5)

## 2024-08-24 PROCEDURE — 93010 ELECTROCARDIOGRAM REPORT: CPT

## 2024-08-24 PROCEDURE — 99232 SBSQ HOSP IP/OBS MODERATE 35: CPT

## 2024-08-24 RX ORDER — POTASSIUM CHLORIDE 10 MEQ
20 TABLET, EXT RELEASE, PARTICLES/CRYSTALS ORAL
Refills: 0 | Status: DISCONTINUED | OUTPATIENT
Start: 2024-08-24 | End: 2024-08-24

## 2024-08-24 RX ORDER — VANCOMYCIN/0.9 % SOD CHLORIDE 1.75G/25
1250 PLASTIC BAG, INJECTION (ML) INTRAVENOUS EVERY 24 HOURS
Refills: 0 | Status: DISCONTINUED | OUTPATIENT
Start: 2024-08-24 | End: 2024-08-26

## 2024-08-24 RX ORDER — POTASSIUM CHLORIDE 10 MEQ
20 TABLET, EXT RELEASE, PARTICLES/CRYSTALS ORAL
Refills: 0 | Status: COMPLETED | OUTPATIENT
Start: 2024-08-24 | End: 2024-08-27

## 2024-08-24 RX ADMIN — Medication 20 MILLIEQUIVALENT(S): at 11:38

## 2024-08-24 RX ADMIN — ACETAMINOPHEN 650 MILLIGRAM(S): 325 TABLET ORAL at 07:20

## 2024-08-24 RX ADMIN — METOPROLOL TARTRATE 25 MILLIGRAM(S): 100 TABLET ORAL at 21:32

## 2024-08-24 RX ADMIN — LOSARTAN POTASSIUM 100 MILLIGRAM(S): 50 TABLET ORAL at 05:01

## 2024-08-24 RX ADMIN — ENOXAPARIN SODIUM 40 MILLIGRAM(S): 100 INJECTION SUBCUTANEOUS at 05:07

## 2024-08-24 RX ADMIN — ACETAMINOPHEN 650 MILLIGRAM(S): 325 TABLET ORAL at 07:50

## 2024-08-24 RX ADMIN — AMLODIPINE BESYLATE 7.5 MILLIGRAM(S): 10 TABLET ORAL at 05:01

## 2024-08-24 RX ADMIN — PIPERACILLIN SODIUM AND TAZOBACTAM SODIUM 25 GRAM(S): 3; .375 INJECTION, POWDER, FOR SOLUTION INTRAVENOUS at 19:46

## 2024-08-24 RX ADMIN — PIPERACILLIN SODIUM AND TAZOBACTAM SODIUM 25 GRAM(S): 3; .375 INJECTION, POWDER, FOR SOLUTION INTRAVENOUS at 05:00

## 2024-08-24 RX ADMIN — PIPERACILLIN SODIUM AND TAZOBACTAM SODIUM 25 GRAM(S): 3; .375 INJECTION, POWDER, FOR SOLUTION INTRAVENOUS at 12:37

## 2024-08-24 NOTE — PROGRESS NOTE ADULT - ASSESSMENT
83F with PMHx HTN, HLD, CKD, bilateral carpal tunnel syndrome, GERD presenting with L 1st toe pain, drainage x3 weeks. Admitted for suspected L 1st toe osteomyelitis 83F with PMHx HTN, HLD, CKD, bilateral carpal tunnel syndrome, GERD presenting with L 1st toe pain, drainage x3 weeks. Admitted for L 1st toe osteomyelitis

## 2024-08-24 NOTE — PROGRESS NOTE ADULT - PROBLEM SELECTOR PLAN 3
Stage III CKD per outpatient notes. Creatinine here 0.97 however  -daily BMP Stage III CKD per outpatient notes  -daily BMP

## 2024-08-24 NOTE — PROGRESS NOTE ADULT - ASSESSMENT
83F presents for left hallux wound  -Pt was seen and evaluated  -Afebrile, WBC 5.93  -Left dorsal hallux wound to bone deep to the nail plate and within the first interspace, nail plate removed, erythema globally about the first digit, moderate malodor, mild purulent drainage, no crepitus  -Left foot x-ray: Focal soft tissue swelling of the distal first phalanx with underlying cortical erosion of the tuft. Findings concerning for osteomyelitis.  -Left foot MRI: OM of first distal phalanx  -Left foot wound culture: no growth (prelim  -ID recs, appreciated   -Pod Plan: local wound care vs left foot partial 1st ray resection pending pt decision  -Discussed with attending

## 2024-08-24 NOTE — PROGRESS NOTE ADULT - SUBJECTIVE AND OBJECTIVE BOX
JOVITA CRAIG ([id number])- Patient is a 83y old  Female who presents with a chief complaint of L 1st toe infection (23 Aug 2024 01:01)      INTERVAL HPI/OVERNIGHT EVENTS:       SUBJECTIVE:     PHYSICAL EXAM:  - GENERAL: Follows conversation appropriately. No acute distress.  - EYES: Tracks me in room.   - HENT: Moist mucous membranes. No scleral icterus.   - LUNGS: Clear to auscultation bilaterally. No accessory muscle use.  - CARDIOVASCULAR: Regular rate and rhythm. No murmur.  - ABDOMEN: Soft, non-tender and non-distended. No palpable masses.  - EXTREMITIES: No edema. Non-tender.  - SKIN: Warm. s/p nail plate removal of L first toe. No pus/drainage  - NEUROLOGIC: No focal neurological deficits apprecaited  - PSYCHIATRIC: Cooperative. Appropriate mood and affect.     JOVITA KIARA ([id number])- Patient is a 83y old  Female who presents with a chief complaint of L 1st toe infection (23 Aug 2024 01:01)      INTERVAL HPI/OVERNIGHT EVENTS: No acute overnight events. Denies fever, chills, chest pain, SOB. Toe pain controlled with tylenol.      SUBJECTIVE: Denies fever, chills, chest pain, SOB. Toe pain controlled with tylenol.      PHYSICAL EXAM:  - GENERAL: Follows conversation appropriately. No acute distress.  - EYES: Tracks me in room.   - HENT: Moist mucous membranes. No scleral icterus.   - LUNGS: Clear to auscultation bilaterally. No accessory muscle use.  - CARDIOVASCULAR: Regular rate and rhythm. No murmur.  - ABDOMEN: Soft, non-tender and non-distended. No palpable masses.  - EXTREMITIES: No edema. Non-tender.  - SKIN: Warm. s/p nail plate removal of L first toe. No pus/drainage  - NEUROLOGIC: No focal neurological deficits apprecaited  - PSYCHIATRIC: Cooperative. Appropriate mood and affect.     JOVITA CRAIG ([id number])- Patient is a 83y old  Female who presents with a chief complaint of L 1st toe infection (23 Aug 2024 01:01)      INTERVAL HPI/OVERNIGHT EVENTS: No acute overnight events.       SUBJECTIVE: Denies fever, chills, chest pain, SOB. Toe pain controlled with tylenol.      PHYSICAL EXAM:  - GENERAL: Follows conversation appropriately. No acute distress.  - EYES: Tracks me in room.   - HENT: Moist mucous membranes. No scleral icterus.   - LUNGS: Clear to auscultation bilaterally. No accessory muscle use.  - CARDIOVASCULAR: Regular rate and rhythm. No murmur.  - ABDOMEN: Soft, non-tender and non-distended. No palpable masses.  - EXTREMITIES: No edema. Non-tender.  - SKIN: Warm. Dressing over L toe clean dry and intact  - NEUROLOGIC: No focal neurological deficits apprecaited  - PSYCHIATRIC: Cooperative. Appropriate mood and affect.    VS  T(C): 36.9 (08-24-24 @ 04:55), Max: 36.9 (08-23-24 @ 21:45)  HR: 77 (08-24-24 @ 04:55) (68 - 77)  BP: 130/72 (08-24-24 @ 04:55) (104/61 - 131/62)  RR: 17 (08-24-24 @ 04:55) (17 - 18)  SpO2: 100% (08-24-24 @ 04:55) (100% - 100%)    LABS (available at time of writing 08-24-24 @ 11:44):                         10.6   5.93  )-----------( 237      ( 24 Aug 2024 08:03 )             32.6     08-24    139  |  103  |  26<H>  ----------------------------<  96  3.8   |  24  |  1.06    Ca    9.2      24 Aug 2024 08:03  Phos  3.0     08-23  Mg     2.00     08-23    TPro  6.8  /  Alb  3.6  /  TBili  0.4  /  DBili  x   /  AST  24  /  ALT  10  /  AlkPhos  81  08-23    PT/INR - ( 23 Aug 2024 06:18 )   PT: 10.9 sec;   INR: 0.97 ratio         PTT - ( 23 Aug 2024 06:18 )  PTT:34.6 sec  Urinalysis Basic - ( 24 Aug 2024 08:03 )    Color: x / Appearance: x / SG: x / pH: x  Gluc: 96 mg/dL / Ketone: x  / Bili: x / Urobili: x   Blood: x / Protein: x / Nitrite: x   Leuk Esterase: x / RBC: x / WBC x   Sq Epi: x / Non Sq Epi: x / Bacteria: x          Culture - Blood (collected 08-22-24 @ 22:30)  Source: Blood Blood  Preliminary Report (08-24-24 @ 03:01):    No growth at 24 hours    Culture - Blood (collected 08-22-24 @ 22:25)  Source: Blood Blood-Peripheral  Preliminary Report (08-24-24 @ 03:01):    No growth at 24 hours    Culture - Abscess with Gram Stain (collected 08-22-24 @ 22:00)  Source: .Abscess left hallux  Gram Stain (08-23-24 @ 23:29):    No polymorphonuclear cells seen per low power field    Rare Gram Negative Rods per oil power field        Medications:   acetaminophen     Tablet .. 650 milliGRAM(s) Oral every 6 hours PRN  amLODIPine   Tablet 7.5 milliGRAM(s) Oral daily  enoxaparin Injectable 40 milliGRAM(s) SubCutaneous every 24 hours  hydrochlorothiazide 25 milliGRAM(s) Oral daily  losartan 100 milliGRAM(s) Oral daily  melatonin 3 milliGRAM(s) Oral at bedtime PRN  metoprolol succinate ER 25 milliGRAM(s) Oral at bedtime  piperacillin/tazobactam IVPB.. 3.375 Gram(s) IV Intermittent every 8 hours  potassium chloride    Tablet ER 20 milliEquivalent(s) Oral two times a day  vancomycin  IVPB 1000 milliGRAM(s) IV Intermittent every 24 hours      RADIOLOGY, EKG & ADDITIONAL TESTS: Reviewed.

## 2024-08-24 NOTE — PROGRESS NOTE ADULT - SUBJECTIVE AND OBJECTIVE BOX
Patient is a 83y old  Female who presents with a chief complaint of L 1st toe infection (24 Aug 2024 07:36)       INTERVAL HPI/OVERNIGHT EVENTS:  Patient seen and evaluated at bedside.  Pt is resting comfortable in NAD. Denies N/V/F/C.    Allergies    iodinated radiocontrast agents (Chills)  Prevacid (Chills)    Intolerances        Vital Signs Last 24 Hrs  T(C): 36.9 (24 Aug 2024 04:55), Max: 36.9 (23 Aug 2024 21:45)  T(F): 98.5 (24 Aug 2024 04:55), Max: 98.5 (23 Aug 2024 21:45)  HR: 77 (24 Aug 2024 04:55) (68 - 77)  BP: 130/72 (24 Aug 2024 04:55) (104/61 - 131/62)  BP(mean): --  RR: 17 (24 Aug 2024 04:55) (17 - 18)  SpO2: 100% (24 Aug 2024 04:55) (100% - 100%)    Parameters below as of 24 Aug 2024 04:55  Patient On (Oxygen Delivery Method): room air        LABS:                        10.6   5.93  )-----------( 237      ( 24 Aug 2024 08:03 )             32.6     08-24    139  |  103  |  26<H>  ----------------------------<  96  3.8   |  24  |  1.06    Ca    9.2      24 Aug 2024 08:03  Phos  3.0     08-23  Mg     2.00     08-23    TPro  6.8  /  Alb  3.6  /  TBili  0.4  /  DBili  x   /  AST  24  /  ALT  10  /  AlkPhos  81  08-23    PT/INR - ( 23 Aug 2024 06:18 )   PT: 10.9 sec;   INR: 0.97 ratio         PTT - ( 23 Aug 2024 06:18 )  PTT:34.6 sec  Urinalysis Basic - ( 24 Aug 2024 08:03 )    Color: x / Appearance: x / SG: x / pH: x  Gluc: 96 mg/dL / Ketone: x  / Bili: x / Urobili: x   Blood: x / Protein: x / Nitrite: x   Leuk Esterase: x / RBC: x / WBC x   Sq Epi: x / Non Sq Epi: x / Bacteria: x      CAPILLARY BLOOD GLUCOSE          Lower Extremity Physical Exam:  Vascular: DP/PT 2/4, B/L, CFT < 3 seconds B/L, Temperature gradient warm to cool, B/L.   Neuro: Epicritic sensation intact to the level of, B/L.  Musculoskeletal/Ortho: unremarkable  Skin: left dorsal hallux wound to bone deep to the nail plate and within the first interspace, nail plate loosening. erythema globally about the first digit, strong malodor, purulent drainage, no crepitus    RADIOLOGY & ADDITIONAL TESTS:  < from: MR Foot w/wo IV Cont, Left (08.23.24 @ 14:24) >    ACC: [id number] EXAM:  MR FOOT WAW IC LT   ORDERED BY: NEELIMA JEFFERSON     PROCEDURE DATE:  08/23/2024          INTERPRETATION:  EXAMINATION: MR FOOT WITHOUT AND WITH IV CONTRAST LEFT    CLINICAL INDICATION:Evaluate for left hallux osteomyelitis.    COMPARISON: Foot radiographs dated 8/22/24    TECHNIQUE: Multiplanar, multi-sequence MRI of the left foot was performed   without and with intravenous contrast.    INTERPRETATION:  There is T1 hypointense/STIR hyperintense signal abnormality with   enhancement involving the distal phalanx of the first digit with   overlying ulcer, findings consistent osteomyelitis.There is moderate   midfoot arthropathy with scattered subchondral edema present at the   tarsometatarsal joints and talonavicular joint.There is no localized   abscess. There is no acute fracture or AVN. There is edema within the   intrinsic muscles of the foot. There is dorsal foot soft tissue edema.    IMPRESSION:  Osteomyelitis of the first digit distal phalanx.    --- End of Report ---            SHLOMIT GOLDBERG STEIN MD; Attending Radiologist  This document has been electronically signed. Aug 23 2024  2:29PM    < end of copied text >

## 2024-08-24 NOTE — PROGRESS NOTE ADULT - PROBLEM SELECTOR PLAN 2
Resume losartan/HCTZ 100-25mg qd, metoprolol succinate 25mg qhs, amlodipine 7.5mg qd Resume losartan/HCTZ 100-25mg qd, metoprolol succinate 25mg qhs, amlodipine 7.5mg qd      > Hold HCTZ and losartan if procedure planned with podiatry  - Cw home KCl

## 2024-08-24 NOTE — PROGRESS NOTE ADULT - PROBLEM SELECTOR PLAN 1
- L 1st toe infection - pain, drainage, mild erythema. Xray: Focal soft tissue swelling along the distal first phalanx.  Cortical erosion along the medial aspect of the first distal phalanx, concerning for osteomyelitis.  - ESR 36, CRP 8.6  - Seen by podiatry - nail plate removed. Wound to bone with purulence identified    > cw vanc, zosyn  > MRI L foot with contrast. Patient notes hx of chills with dye contrast when getting a CT in past. Never had MRI w contrast in past. Has had MRI Head but   > ID consult for abx management  > f/u BCx x2, Wound culture - L 1st toe infection - pain, drainage, mild erythema. Xray: Focal soft tissue swelling along the distal first phalanx.  Cortical erosion along the medial aspect of the first distal phalanx, concerning for osteomyelitis.  - ESR 36, CRP 8.6  - Seen by podiatry - nail plate removed. Wound to bone with purulence identified    > cw vanc, zosyn  > MRI L foot with contrast. Patient notes hx of chills with dye contrast when getting a CT in past. Never had MRI w contrast in past.  > ID consult for abx management, apprecaite recs  > f/u BCx x2, Wound culture       > WC growing G- rods  > Podiatry for possible resection

## 2024-08-25 LAB
-  AMPICILLIN: SIGNIFICANT CHANGE UP
-  AMPICILLIN: SIGNIFICANT CHANGE UP
-  VANCOMYCIN: SIGNIFICANT CHANGE UP
-  VANCOMYCIN: SIGNIFICANT CHANGE UP
ANION GAP SERPL CALC-SCNC: 15 MMOL/L — HIGH (ref 7–14)
BUN SERPL-MCNC: 22 MG/DL — SIGNIFICANT CHANGE UP (ref 7–23)
CALCIUM SERPL-MCNC: 9.3 MG/DL — SIGNIFICANT CHANGE UP (ref 8.4–10.5)
CHLORIDE SERPL-SCNC: 104 MMOL/L — SIGNIFICANT CHANGE UP (ref 98–107)
CO2 SERPL-SCNC: 22 MMOL/L — SIGNIFICANT CHANGE UP (ref 22–31)
CREAT SERPL-MCNC: 1.01 MG/DL — SIGNIFICANT CHANGE UP (ref 0.5–1.3)
EGFR: 55 ML/MIN/1.73M2 — LOW
GLUCOSE SERPL-MCNC: 112 MG/DL — HIGH (ref 70–99)
METHOD TYPE: SIGNIFICANT CHANGE UP
METHOD TYPE: SIGNIFICANT CHANGE UP
POTASSIUM SERPL-MCNC: 4.2 MMOL/L — SIGNIFICANT CHANGE UP (ref 3.5–5.3)
POTASSIUM SERPL-SCNC: 4.2 MMOL/L — SIGNIFICANT CHANGE UP (ref 3.5–5.3)
SODIUM SERPL-SCNC: 141 MMOL/L — SIGNIFICANT CHANGE UP (ref 135–145)

## 2024-08-25 PROCEDURE — 99232 SBSQ HOSP IP/OBS MODERATE 35: CPT | Mod: GC

## 2024-08-25 RX ADMIN — Medication 150 MILLIGRAM(S): at 23:44

## 2024-08-25 RX ADMIN — PIPERACILLIN SODIUM AND TAZOBACTAM SODIUM 25 GRAM(S): 3; .375 INJECTION, POWDER, FOR SOLUTION INTRAVENOUS at 04:41

## 2024-08-25 RX ADMIN — ENOXAPARIN SODIUM 40 MILLIGRAM(S): 100 INJECTION SUBCUTANEOUS at 05:14

## 2024-08-25 RX ADMIN — Medication 20 MILLIEQUIVALENT(S): at 17:24

## 2024-08-25 RX ADMIN — PIPERACILLIN SODIUM AND TAZOBACTAM SODIUM 25 GRAM(S): 3; .375 INJECTION, POWDER, FOR SOLUTION INTRAVENOUS at 20:24

## 2024-08-25 RX ADMIN — Medication 20 MILLIEQUIVALENT(S): at 05:14

## 2024-08-25 RX ADMIN — PIPERACILLIN SODIUM AND TAZOBACTAM SODIUM 25 GRAM(S): 3; .375 INJECTION, POWDER, FOR SOLUTION INTRAVENOUS at 12:28

## 2024-08-25 RX ADMIN — LOSARTAN POTASSIUM 100 MILLIGRAM(S): 50 TABLET ORAL at 05:14

## 2024-08-25 RX ADMIN — AMLODIPINE BESYLATE 7.5 MILLIGRAM(S): 10 TABLET ORAL at 05:14

## 2024-08-25 RX ADMIN — METOPROLOL TARTRATE 25 MILLIGRAM(S): 100 TABLET ORAL at 22:58

## 2024-08-25 RX ADMIN — Medication 150 MILLIGRAM(S): at 00:40

## 2024-08-25 NOTE — PROGRESS NOTE ADULT - PROBLEM SELECTOR PLAN 1
- L 1st toe infection - pain, drainage, mild erythema. Xray: Focal soft tissue swelling along the distal first phalanx.  Cortical erosion along the medial aspect of the first distal phalanx, concerning for osteomyelitis.  - ESR 36, CRP 8.6  - Seen by podiatry - nail plate removed. Wound to bone with purulence identified    > cw vanc, zosyn  > MRI L foot with contrast. Patient notes hx of chills with dye contrast when getting a CT in past. Never had MRI w contrast in past.  > ID consult for abx management, apprecaite recs  > f/u BCx x2, Wound culture       > WC growing G- rods  > Podiatry for possible resection - L 1st toe infection - pain, drainage, mild erythema. Xray: Focal soft tissue swelling along the distal first phalanx.  Cortical erosion along the medial aspect of the first distal phalanx, concerning for osteomyelitis.  - ESR 36, CRP 8.6  - Seen by podiatry - nail plate removed. Wound to bone with purulence identified    > cw vanc, zosyn  > MRI L foot significant for osteomyelitis of L hallux  > ID consult for abx management, apprecaite recs  > f/u BCx x2, Wound culture       > WC growing Pseudomonas and Enterococcus spp.   > Podiatry planning for potential resection pending pt decision

## 2024-08-25 NOTE — PROGRESS NOTE ADULT - ASSESSMENT
83F with PMHx HTN, HLD, CKD, bilateral carpal tunnel syndrome, GERD presenting with L 1st toe pain, drainage x3 weeks. Admitted for L 1st toe osteomyelitis 83F with PMHx HTN, HLD, CKD, bilateral carpal tunnel syndrome, GERD presenting with L 1st toe pain, drainage x3 weeks. Admitted for L 1st toe osteomyelitis and undergoing consideration for partial amputation of L hallux.

## 2024-08-25 NOTE — PROGRESS NOTE ADULT - SUBJECTIVE AND OBJECTIVE BOX
Tyler Barrios MD  PGY 3 Department of Internal Medicine        Patient is a 83y old  Female who presents with a chief complaint of L 1st toe infection (24 Aug 2024 09:25)      SUBJECTIVE / OVERNIGHT EVENTS: Pt seen and examined. No acute overnight events. Denies fevers, chills, CP, SOB, Abdominal pain, N/V, Constipation, Diarrhea        MEDICATIONS  (STANDING):  amLODIPine   Tablet 7.5 milliGRAM(s) Oral daily  enoxaparin Injectable 40 milliGRAM(s) SubCutaneous every 24 hours  hydrochlorothiazide 25 milliGRAM(s) Oral daily  losartan 100 milliGRAM(s) Oral daily  metoprolol succinate ER 25 milliGRAM(s) Oral at bedtime  piperacillin/tazobactam IVPB.. 3.375 Gram(s) IV Intermittent every 8 hours  potassium chloride    Tablet ER 20 milliEquivalent(s) Oral two times a day  vancomycin  IVPB 1250 milliGRAM(s) IV Intermittent every 24 hours    MEDICATIONS  (PRN):  acetaminophen     Tablet .. 650 milliGRAM(s) Oral every 6 hours PRN Temp greater or equal to 38C (100.4F), Mild Pain (1 - 3), Moderate Pain (4 - 6)  melatonin 3 milliGRAM(s) Oral at bedtime PRN Insomnia      I&O's Summary      Vital Signs Last 24 Hrs  T(C): 36.8 (25 Aug 2024 05:12), Max: 36.8 (25 Aug 2024 05:12)  T(F): 98.3 (25 Aug 2024 05:12), Max: 98.3 (25 Aug 2024 05:12)  HR: 61 (25 Aug 2024 05:12) (61 - 68)  BP: 127/86 (25 Aug 2024 05:12) (122/68 - 127/86)  BP(mean): --  RR: 17 (25 Aug 2024 05:12) (16 - 17)  SpO2: 100% (25 Aug 2024 05:12) (100% - 100%)    Parameters below as of 25 Aug 2024 05:12  Patient On (Oxygen Delivery Method): room air        CAPILLARY BLOOD GLUCOSE          PHYSICAL EXAM:  GENERAL: NAD, lying in bed comfortably  HEAD:  Atraumatic, normocephalic  EYES: EOMI, PERRLA, conjunctiva clear, no conjunctival pallor, anicteric sclera  NECK: Supple, trachea midline, no JVD  HEART: Regular rate and rhythm, Normal S1 S2, no murmurs, rubs, or gallops  LUNGS: Unlabored respirations. Clear to ascultation bilaterally, no crackles, wheezing, or rhonchi  ABDOMEN: Soft, nondistended, nontender, no rebound or guarding, bowel sounds presents  EXTREMITIES: Warm extremities, no clubbing, cyanosis, or edema, peripheral pulses 2+ bilaterally  MUSCULOSKELETAL: No joint swelling or tenderness to palpation  NEURO: CN 2-12 grossly intact, moves all limbs spontaneously  SKIN: No rashes or lesions         LABS:                        10.6   5.93  )-----------( 237      ( 24 Aug 2024 08:03 )             32.6     Auto Eosinophil # x     / Auto Eosinophil % x     / Auto Neutrophil # x     / Auto Neutrophil % x     / BANDS % x        08-24    139  |  103  |  26<H>  ----------------------------<  96  3.8   |  24  |  1.06    Ca    9.2      24 Aug 2024 08:03          Urinalysis Basic - ( 24 Aug 2024 08:03 )    Color: x / Appearance: x / SG: x / pH: x  Gluc: 96 mg/dL / Ketone: x  / Bili: x / Urobili: x   Blood: x / Protein: x / Nitrite: x   Leuk Esterase: x / RBC: x / WBC x   Sq Epi: x / Non Sq Epi: x / Bacteria: x            RADIOLOGY & ADDITIONAL TESTS:    Imaging Personally Reviewed:    Consultant(s) Notes Reviewed:      Care Discussed with Consultants/Other Providers:   Tyler Barrios MD  PGY 3 Department of Internal Medicine        Patient is a 83y old  Female who presents with a chief complaint of L 1st toe infection (24 Aug 2024 09:25)      SUBJECTIVE / OVERNIGHT EVENTS: Pt seen and examined. No acute overnight events. Maintains ambulation, no worsening L foot pain or swelling. Denies fevers, chills, CP, SOB, Abdominal pain, N/V, Constipation, Diarrhea        MEDICATIONS  (STANDING):  amLODIPine   Tablet 7.5 milliGRAM(s) Oral daily  enoxaparin Injectable 40 milliGRAM(s) SubCutaneous every 24 hours  hydrochlorothiazide 25 milliGRAM(s) Oral daily  losartan 100 milliGRAM(s) Oral daily  metoprolol succinate ER 25 milliGRAM(s) Oral at bedtime  piperacillin/tazobactam IVPB.. 3.375 Gram(s) IV Intermittent every 8 hours  potassium chloride    Tablet ER 20 milliEquivalent(s) Oral two times a day  vancomycin  IVPB 1250 milliGRAM(s) IV Intermittent every 24 hours    MEDICATIONS  (PRN):  acetaminophen     Tablet .. 650 milliGRAM(s) Oral every 6 hours PRN Temp greater or equal to 38C (100.4F), Mild Pain (1 - 3), Moderate Pain (4 - 6)  melatonin 3 milliGRAM(s) Oral at bedtime PRN Insomnia      I&O's Summary      Vital Signs Last 24 Hrs  T(C): 36.8 (25 Aug 2024 05:12), Max: 36.8 (25 Aug 2024 05:12)  T(F): 98.3 (25 Aug 2024 05:12), Max: 98.3 (25 Aug 2024 05:12)  HR: 61 (25 Aug 2024 05:12) (61 - 68)  BP: 127/86 (25 Aug 2024 05:12) (122/68 - 127/86)  BP(mean): --  RR: 17 (25 Aug 2024 05:12) (16 - 17)  SpO2: 100% (25 Aug 2024 05:12) (100% - 100%)    Parameters below as of 25 Aug 2024 05:12  Patient On (Oxygen Delivery Method): room air        CAPILLARY BLOOD GLUCOSE          PHYSICAL EXAM:  GENERAL: NAD, lying in bed comfortably  HEAD:  Atraumatic, normocephalic  EYES: EOMI, PERRLA, conjunctiva clear, no conjunctival pallor, anicteric sclera  NECK: Supple, trachea midline, no JVD  HEART: Regular rate and rhythm, Normal S1 S2, no murmurs, rubs, or gallops  LUNGS: Unlabored respirations. Clear to ascultation bilaterally, no crackles, wheezing, or rhonchi  ABDOMEN: Soft, nondistended, nontender, no rebound or guarding, bowel sounds presents  EXTREMITIES: Warm extremities, no clubbing, cyanosis, or edema, peripheral pulses 2+ bilaterally. (+) L hallux dressing in place - no significant purulent drainage from nail be upon inspection.   MUSCULOSKELETAL: No joint swelling or tenderness to palpation  NEURO: CN 2-12 grossly intact, moves all limbs spontaneously  SKIN: No rashes or lesions         LABS:                        10.6   5.93  )-----------( 237      ( 24 Aug 2024 08:03 )             32.6     Auto Eosinophil # x     / Auto Eosinophil % x     / Auto Neutrophil # x     / Auto Neutrophil % x     / BANDS % x        08-24    139  |  103  |  26<H>  ----------------------------<  96  3.8   |  24  |  1.06    Ca    9.2      24 Aug 2024 08:03          Urinalysis Basic - ( 24 Aug 2024 08:03 )    Color: x / Appearance: x / SG: x / pH: x  Gluc: 96 mg/dL / Ketone: x  / Bili: x / Urobili: x   Blood: x / Protein: x / Nitrite: x   Leuk Esterase: x / RBC: x / WBC x   Sq Epi: x / Non Sq Epi: x / Bacteria: x      Imaging Personally Reviewed: Yes    Consultant(s) Notes Reviewed:  Yes    Care Discussed with Consultants/Other Providers: Yes

## 2024-08-25 NOTE — PROGRESS NOTE ADULT - PROBLEM SELECTOR PLAN 2
Resume losartan/HCTZ 100-25mg qd, metoprolol succinate 25mg qhs, amlodipine 7.5mg qd      > Hold HCTZ and losartan if procedure planned with podiatry  - Cw home KCl Resume losartan/HCTZ 100-25mg qd, metoprolol succinate 25mg qhs, amlodipine 7.5mg qd      > Hold HCTZ and losartan if procedure planned with podiatry  - Cw home KCl, monitor K while not receiving diuretic - likely will not require K supplementation while off diuresis

## 2024-08-26 ENCOUNTER — RESULT REVIEW (OUTPATIENT)
Age: 84
End: 2024-08-26

## 2024-08-26 LAB
-  AMOXICILLIN/CLAVULANIC ACID: SIGNIFICANT CHANGE UP
-  AMPICILLIN/SULBACTAM: SIGNIFICANT CHANGE UP
-  AMPICILLIN: SIGNIFICANT CHANGE UP
-  AZTREONAM: SIGNIFICANT CHANGE UP
-  AZTREONAM: SIGNIFICANT CHANGE UP
-  CEFAZOLIN: SIGNIFICANT CHANGE UP
-  CEFEPIME: SIGNIFICANT CHANGE UP
-  CEFEPIME: SIGNIFICANT CHANGE UP
-  CEFOXITIN: SIGNIFICANT CHANGE UP
-  CEFTAZIDIME: SIGNIFICANT CHANGE UP
-  CEFTRIAXONE: SIGNIFICANT CHANGE UP
-  CIPROFLOXACIN: SIGNIFICANT CHANGE UP
-  CIPROFLOXACIN: SIGNIFICANT CHANGE UP
-  ERTAPENEM: SIGNIFICANT CHANGE UP
-  GENTAMICIN: SIGNIFICANT CHANGE UP
-  IMIPENEM: SIGNIFICANT CHANGE UP
-  LEVOFLOXACIN: SIGNIFICANT CHANGE UP
-  LEVOFLOXACIN: SIGNIFICANT CHANGE UP
-  MEROPENEM: SIGNIFICANT CHANGE UP
-  MEROPENEM: SIGNIFICANT CHANGE UP
-  PIPERACILLIN/TAZOBACTAM: SIGNIFICANT CHANGE UP
-  PIPERACILLIN/TAZOBACTAM: SIGNIFICANT CHANGE UP
-  TOBRAMYCIN: SIGNIFICANT CHANGE UP
-  TRIMETHOPRIM/SULFAMETHOXAZOLE: SIGNIFICANT CHANGE UP
ALBUMIN SERPL ELPH-MCNC: 3.4 G/DL — SIGNIFICANT CHANGE UP (ref 3.3–5)
ALP SERPL-CCNC: 64 U/L — SIGNIFICANT CHANGE UP (ref 40–120)
ALT FLD-CCNC: 14 U/L — SIGNIFICANT CHANGE UP (ref 4–33)
ANION GAP SERPL CALC-SCNC: 10 MMOL/L — SIGNIFICANT CHANGE UP (ref 7–14)
APTT BLD: 34.5 SEC — SIGNIFICANT CHANGE UP (ref 24.5–35.6)
AST SERPL-CCNC: 26 U/L — SIGNIFICANT CHANGE UP (ref 4–32)
BASOPHILS # BLD AUTO: 0.03 K/UL — SIGNIFICANT CHANGE UP (ref 0–0.2)
BASOPHILS NFR BLD AUTO: 0.5 % — SIGNIFICANT CHANGE UP (ref 0–2)
BILIRUB SERPL-MCNC: 0.2 MG/DL — SIGNIFICANT CHANGE UP (ref 0.2–1.2)
BUN SERPL-MCNC: 26 MG/DL — HIGH (ref 7–23)
CALCIUM SERPL-MCNC: 9.1 MG/DL — SIGNIFICANT CHANGE UP (ref 8.4–10.5)
CHLORIDE SERPL-SCNC: 105 MMOL/L — SIGNIFICANT CHANGE UP (ref 98–107)
CO2 SERPL-SCNC: 23 MMOL/L — SIGNIFICANT CHANGE UP (ref 22–31)
CREAT SERPL-MCNC: 1.14 MG/DL — SIGNIFICANT CHANGE UP (ref 0.5–1.3)
CULTURE RESULTS: ABNORMAL
EGFR: 47 ML/MIN/1.73M2 — LOW
EOSINOPHIL # BLD AUTO: 0.37 K/UL — SIGNIFICANT CHANGE UP (ref 0–0.5)
EOSINOPHIL NFR BLD AUTO: 6 % — SIGNIFICANT CHANGE UP (ref 0–6)
GLUCOSE SERPL-MCNC: 100 MG/DL — HIGH (ref 70–99)
HCT VFR BLD CALC: 33.8 % — LOW (ref 34.5–45)
HGB BLD-MCNC: 10.7 G/DL — LOW (ref 11.5–15.5)
IANC: 3.59 K/UL — SIGNIFICANT CHANGE UP (ref 1.8–7.4)
IMM GRANULOCYTES NFR BLD AUTO: 0.3 % — SIGNIFICANT CHANGE UP (ref 0–0.9)
INR BLD: 0.97 RATIO — SIGNIFICANT CHANGE UP (ref 0.85–1.18)
LYMPHOCYTES # BLD AUTO: 1.32 K/UL — SIGNIFICANT CHANGE UP (ref 1–3.3)
LYMPHOCYTES # BLD AUTO: 21.4 % — SIGNIFICANT CHANGE UP (ref 13–44)
MAGNESIUM SERPL-MCNC: 2.1 MG/DL — SIGNIFICANT CHANGE UP (ref 1.6–2.6)
MCHC RBC-ENTMCNC: 29.4 PG — SIGNIFICANT CHANGE UP (ref 27–34)
MCHC RBC-ENTMCNC: 31.7 GM/DL — LOW (ref 32–36)
MCV RBC AUTO: 92.9 FL — SIGNIFICANT CHANGE UP (ref 80–100)
METHOD TYPE: SIGNIFICANT CHANGE UP
METHOD TYPE: SIGNIFICANT CHANGE UP
MONOCYTES # BLD AUTO: 0.85 K/UL — SIGNIFICANT CHANGE UP (ref 0–0.9)
MONOCYTES NFR BLD AUTO: 13.8 % — SIGNIFICANT CHANGE UP (ref 2–14)
NEUTROPHILS # BLD AUTO: 3.59 K/UL — SIGNIFICANT CHANGE UP (ref 1.8–7.4)
NEUTROPHILS NFR BLD AUTO: 58 % — SIGNIFICANT CHANGE UP (ref 43–77)
NRBC # BLD: 0 /100 WBCS — SIGNIFICANT CHANGE UP (ref 0–0)
NRBC # FLD: 0 K/UL — SIGNIFICANT CHANGE UP (ref 0–0)
ORGANISM # SPEC MICROSCOPIC CNT: ABNORMAL
PHOSPHATE SERPL-MCNC: 3.2 MG/DL — SIGNIFICANT CHANGE UP (ref 2.5–4.5)
PLATELET # BLD AUTO: 260 K/UL — SIGNIFICANT CHANGE UP (ref 150–400)
POTASSIUM SERPL-MCNC: 4 MMOL/L — SIGNIFICANT CHANGE UP (ref 3.5–5.3)
POTASSIUM SERPL-SCNC: 4 MMOL/L — SIGNIFICANT CHANGE UP (ref 3.5–5.3)
PROT SERPL-MCNC: 6.6 G/DL — SIGNIFICANT CHANGE UP (ref 6–8.3)
PROTHROM AB SERPL-ACNC: 10.9 SEC — SIGNIFICANT CHANGE UP (ref 9.5–13)
RBC # BLD: 3.64 M/UL — LOW (ref 3.8–5.2)
RBC # FLD: 14.3 % — SIGNIFICANT CHANGE UP (ref 10.3–14.5)
SODIUM SERPL-SCNC: 138 MMOL/L — SIGNIFICANT CHANGE UP (ref 135–145)
SPECIMEN SOURCE: SIGNIFICANT CHANGE UP
WBC # BLD: 6.18 K/UL — SIGNIFICANT CHANGE UP (ref 3.8–10.5)
WBC # FLD AUTO: 6.18 K/UL — SIGNIFICANT CHANGE UP (ref 3.8–10.5)

## 2024-08-26 PROCEDURE — 93922 UPR/L XTREMITY ART 2 LEVELS: CPT | Mod: 26

## 2024-08-26 PROCEDURE — 99233 SBSQ HOSP IP/OBS HIGH 50: CPT

## 2024-08-26 RX ADMIN — Medication 20 MILLIEQUIVALENT(S): at 17:34

## 2024-08-26 RX ADMIN — Medication 20 MILLIEQUIVALENT(S): at 05:40

## 2024-08-26 RX ADMIN — LOSARTAN POTASSIUM 100 MILLIGRAM(S): 50 TABLET ORAL at 05:41

## 2024-08-26 RX ADMIN — PIPERACILLIN SODIUM AND TAZOBACTAM SODIUM 25 GRAM(S): 3; .375 INJECTION, POWDER, FOR SOLUTION INTRAVENOUS at 04:07

## 2024-08-26 RX ADMIN — ENOXAPARIN SODIUM 40 MILLIGRAM(S): 100 INJECTION SUBCUTANEOUS at 05:41

## 2024-08-26 RX ADMIN — AMLODIPINE BESYLATE 7.5 MILLIGRAM(S): 10 TABLET ORAL at 05:41

## 2024-08-26 RX ADMIN — PIPERACILLIN SODIUM AND TAZOBACTAM SODIUM 25 GRAM(S): 3; .375 INJECTION, POWDER, FOR SOLUTION INTRAVENOUS at 21:33

## 2024-08-26 RX ADMIN — PIPERACILLIN SODIUM AND TAZOBACTAM SODIUM 25 GRAM(S): 3; .375 INJECTION, POWDER, FOR SOLUTION INTRAVENOUS at 12:52

## 2024-08-26 NOTE — PROGRESS NOTE ADULT - ASSESSMENT
83F presents for left hallux wound  -Pt was seen and evaluated  -Afebrile, no leukocytosis  -Left dorsal hallux wound to bone at the nail bed and within the first interspace,  erythema globally about the first digit, strong malodor, purulent drainage, no crepitus  -Left foot x-ray: Focal soft tissue swelling of the distal first phalanx with underlying cortical erosion of the tuft. Findings concerning for osteomyelitis.  -Left foot MRI: OM of first distal phalanx  -Left foot wound culture: Enterococcus faecalis/avium, Proteus mirabilis, Pseudomonas, few Bacteriodes   -ID recs, appreciated   -Pod Plan: local wound care vs left foot partial 1st ray resection pending pt decision  -Discussed with attending

## 2024-08-26 NOTE — PROGRESS NOTE ADULT - PROBLEM SELECTOR PLAN 2
Resume losartan/HCTZ 100-25mg qd, metoprolol succinate 25mg qhs, amlodipine 7.5mg qd      > Hold HCTZ and losartan if procedure planned with podiatry  - Cw home KCl, monitor K while not receiving diuretic - likely will not require K supplementation while off diuresis Home meds:  losartan/HCTZ 100-25mg qd, metoprolol succinate 25mg qhs, amlodipine 7.5mg qd    - Cont home losartan 100mg + HCTZ 25mg + Toprol 25      > Hold HCTZ and losartan if procedure planned with podiatry  - Cw home KCl, monitor K while not receiving diuretic - likely will not require K supplementation while off diuresis Home meds:  losartan/HCTZ 100-25mg qd, metoprolol succinate 25mg qhs, amlodipine 7.5mg qd    - Cont home losartan 100mg + HCTZ 25mg + Toprol 25         - Hold HCTZ and losartan if procedure planned with podiatry  - Cw home KCl, monitor K while not receiving diuretic - likely will not require K supplementation while off diuresis

## 2024-08-26 NOTE — PROGRESS NOTE ADULT - SUBJECTIVE AND OBJECTIVE BOX
Patient is a 83y old  Female who presents with a chief complaint of L 1st toe infection (24 Aug 2024 09:25)      SUBJECTIVE / OVERNIGHT EVENTS:   - no Acute overnight events  - VS WNL  - on Vanc/Zosyn      PHYSICAL EXAM:  GENERAL: NAD, lying in bed comfortably  HEAD:  Atraumatic, normocephalic  EYES: EOMI, PERRLA, conjunctiva clear, no conjunctival pallor, anicteric sclera  NECK: Supple, trachea midline, no JVD  HEART: Regular rate and rhythm, Normal S1 S2, no murmurs, rubs, or gallops  LUNGS: Unlabored respirations. Clear to ascultation bilaterally, no crackles, wheezing, or rhonchi  ABDOMEN: Soft, nondistended, nontender, no rebound or guarding, bowel sounds presents  EXTREMITIES: Warm extremities, no clubbing, cyanosis, or edema, peripheral pulses 2+ bilaterally. (+) L hallux dressing in place - no significant purulent drainage from nail be upon inspection.   MUSCULOSKELETAL: No joint swelling or tenderness to palpation  NEURO: CN 2-12 grossly intact, moves all limbs spontaneously  SKIN: No rashes or lesions      VS  T(C): 36.6 (08-26-24 @ 05:38), Max: 36.6 (08-25-24 @ 22:11)  HR: 67 (08-26-24 @ 05:38) (65 - 72)  BP: 121/70 (08-26-24 @ 05:38) (116/68 - 132/64)  RR: 16 (08-26-24 @ 05:38) (16 - 18)  SpO2: 100% (08-26-24 @ 05:38) (100% - 100%)    LABS (available at time of writing 08-26-24 @ 08:02):                         10.7   6.18  )-----------( 260      ( 26 Aug 2024 06:19 )             33.8     08-25    141  |  104  |  22  ----------------------------<  112<H>  4.2   |  22  |  1.01    Ca    9.3      25 Aug 2024 09:30      PT/INR - ( 26 Aug 2024 06:19 )   PT: 10.9 sec;   INR: 0.97 ratio         PTT - ( 26 Aug 2024 06:19 )  PTT:34.5 sec  Urinalysis Basic - ( 25 Aug 2024 09:30 )    Color: x / Appearance: x / SG: x / pH: x  Gluc: 112 mg/dL / Ketone: x  / Bili: x / Urobili: x   Blood: x / Protein: x / Nitrite: x   Leuk Esterase: x / RBC: x / WBC x   Sq Epi: x / Non Sq Epi: x / Bacteria: x          Culture - Blood (collected 08-22-24 @ 22:30)  Source: .Blood Blood  Preliminary Report (08-26-24 @ 03:01):    No growth at 72 Hours    Culture - Blood (collected 08-22-24 @ 22:25)  Source: .Blood Blood-Peripheral  Preliminary Report (08-26-24 @ 03:01):    No growth at 72 Hours    Culture - Abscess with Gram Stain (collected 08-22-24 @ 22:00)  Source: .Abscess left hallux  Gram Stain (08-23-24 @ 23:29):    No polymorphonuclear cells seen per low power field    Rare Gram Negative Rods per oil power field  Preliminary Report (08-25-24 @ 13:35):    Few Pseudomonas aeruginosa Susceptibility to follow.    Moderate Enterococcus avium    Few Enterococcus faecalis    Moderate Bacteroides fragilis "Susceptibilities not performed"    Rare Proteus mirabilis Susceptibility to follow.  Organism: Enterococcus avium  Enterococcus faecalis (08-25-24 @ 13:35)  Organism: Enterococcus faecalis (08-25-24 @ 13:35)      Method Type: DAWIT      -  Ampicillin: S <=2 Predicts results to ampicillin/sulbactam, amoxacillin-clavulanate and  piperacillin-tazobactam.      -  Vancomycin: S 2  Organism: Enterococcus avium (08-25-24 @ 13:34)      Method Type: DAWIT      -  Ampicillin: S <=2 Predicts results to ampicillin/sulbactam, amoxacillin-clavulanate and  piperacillin-tazobactam.      -  Vancomycin: S 0.5        Medications:   acetaminophen     Tablet .. 650 milliGRAM(s) Oral every 6 hours PRN  amLODIPine   Tablet 7.5 milliGRAM(s) Oral daily  enoxaparin Injectable 40 milliGRAM(s) SubCutaneous every 24 hours  hydrochlorothiazide 25 milliGRAM(s) Oral daily  losartan 100 milliGRAM(s) Oral daily  melatonin 3 milliGRAM(s) Oral at bedtime PRN  metoprolol succinate ER 25 milliGRAM(s) Oral at bedtime  piperacillin/tazobactam IVPB.. 3.375 Gram(s) IV Intermittent every 8 hours  potassium chloride    Tablet ER 20 milliEquivalent(s) Oral two times a day      RADIOLOGY, EKG & ADDITIONAL TESTS: Reviewed.    Patient is a 83y old  Female who presents with a chief complaint of L 1st toe infection (24 Aug 2024 09:25)      SUBJECTIVE / OVERNIGHT EVENTS:   - no Acute overnight events  - VS WNL  - S/p Vanc, continuing Zosyn  - Discussed with her that best curative option is surgical resection followed by shorter term ABx opposed to long term ABx w/o resection. Patient continues to evaluate her options and encouraged to raise any questions before I see her again tomorrow.      PHYSICAL EXAM:  GENERAL: NAD, lying in bed comfortably  HEAD:  Atraumatic, normocephalic  EYES: EOMI, , conjunctiva clear, no conjunctival pallor, anicteric sclera  HEART: Regular rate and rhythm, Normal S1 S2, no murmurs, rubs, or gallops  LUNGS: Unlabored respirations. Clear to ascultation bilaterally, no crackles, wheezing, or rhonchi  ABDOMEN: Soft, nondistended, nontender, no rebound or guarding, bowel sounds presents  EXTREMITIES: Warm extremities, no clubbing, cyanosis, or edema, peripheral pulses 2+ bilaterally. (+) L hallux dressing in place - no significant purulent drainage from nail be upon inspection.   MUSCULOSKELETAL: No joint swelling or tenderness to palpation  NEURO: moves all limbs spontaneously  SKIN: No rashes      VS  T(C): 36.6 (08-26-24 @ 05:38), Max: 36.6 (08-25-24 @ 22:11)  HR: 67 (08-26-24 @ 05:38) (65 - 72)  BP: 121/70 (08-26-24 @ 05:38) (116/68 - 132/64)  RR: 16 (08-26-24 @ 05:38) (16 - 18)  SpO2: 100% (08-26-24 @ 05:38) (100% - 100%)    LABS (available at time of writing 08-26-24 @ 08:02):                         10.7   6.18  )-----------( 260      ( 26 Aug 2024 06:19 )             33.8     08-25    141  |  104  |  22  ----------------------------<  112<H>  4.2   |  22  |  1.01    Ca    9.3      25 Aug 2024 09:30      PT/INR - ( 26 Aug 2024 06:19 )   PT: 10.9 sec;   INR: 0.97 ratio         PTT - ( 26 Aug 2024 06:19 )  PTT:34.5 sec  Urinalysis Basic - ( 25 Aug 2024 09:30 )    Color: x / Appearance: x / SG: x / pH: x  Gluc: 112 mg/dL / Ketone: x  / Bili: x / Urobili: x   Blood: x / Protein: x / Nitrite: x   Leuk Esterase: x / RBC: x / WBC x   Sq Epi: x / Non Sq Epi: x / Bacteria: x          Culture - Blood (collected 08-22-24 @ 22:30)  Source: .Blood Blood  Preliminary Report (08-26-24 @ 03:01):    No growth at 72 Hours    Culture - Blood (collected 08-22-24 @ 22:25)  Source: .Blood Blood-Peripheral  Preliminary Report (08-26-24 @ 03:01):    No growth at 72 Hours    Culture - Abscess with Gram Stain (collected 08-22-24 @ 22:00)  Source: .Abscess left hallux  Gram Stain (08-23-24 @ 23:29):    No polymorphonuclear cells seen per low power field    Rare Gram Negative Rods per oil power field  Preliminary Report (08-25-24 @ 13:35):    Few Pseudomonas aeruginosa Susceptibility to follow.    Moderate Enterococcus avium    Few Enterococcus faecalis    Moderate Bacteroides fragilis "Susceptibilities not performed"    Rare Proteus mirabilis Susceptibility to follow.  Organism: Enterococcus avium  Enterococcus faecalis (08-25-24 @ 13:35)  Organism: Enterococcus faecalis (08-25-24 @ 13:35)      Method Type: DAWIT      -  Ampicillin: S <=2 Predicts results to ampicillin/sulbactam, amoxacillin-clavulanate and  piperacillin-tazobactam.      -  Vancomycin: S 2  Organism: Enterococcus avium (08-25-24 @ 13:34)      Method Type: DAWIT      -  Ampicillin: S <=2 Predicts results to ampicillin/sulbactam, amoxacillin-clavulanate and  piperacillin-tazobactam.      -  Vancomycin: S 0.5        Medications:   acetaminophen     Tablet .. 650 milliGRAM(s) Oral every 6 hours PRN  amLODIPine   Tablet 7.5 milliGRAM(s) Oral daily  enoxaparin Injectable 40 milliGRAM(s) SubCutaneous every 24 hours  hydrochlorothiazide 25 milliGRAM(s) Oral daily  losartan 100 milliGRAM(s) Oral daily  melatonin 3 milliGRAM(s) Oral at bedtime PRN  metoprolol succinate ER 25 milliGRAM(s) Oral at bedtime  piperacillin/tazobactam IVPB.. 3.375 Gram(s) IV Intermittent every 8 hours  potassium chloride    Tablet ER 20 milliEquivalent(s) Oral two times a day      RADIOLOGY, EKG & ADDITIONAL TESTS: Reviewed.   < from: MR Foot w/wo IV Cont, Left (08.23.24 @ 14:24) >  MR FOOT WAW IC LT   ORDERED BY: NEELIMA JEFFERSON     PROCEDURE DATE:  08/23/2024          INTERPRETATION:  EXAMINATION: MR FOOT WITHOUT AND WITH IV CONTRAST LEFT    CLINICAL INDICATION:Evaluate for left hallux osteomyelitis.    COMPARISON: Foot radiographs dated 8/22/24    TECHNIQUE: Multiplanar, multi-sequence MRI of the left foot was performed   without and with intravenous contrast.    INTERPRETATION:  There is T1 hypointense/STIR hyperintense signal abnormality with   enhancement involving the distal phalanx of the first digit with   overlying ulcer, findings consistent osteomyelitis.There is moderate   midfoot arthropathy with scattered subchondral edema present at the   tarsometatarsal joints and talonavicular joint.There is no localized   abscess. There is no acute fracture or AVN. There is edema within the   intrinsic muscles of the foot. There is dorsal foot soft tissue edema.    IMPRESSION:  Osteomyelitis of the first digit distal phalanx.    < end of copied text >

## 2024-08-26 NOTE — PROGRESS NOTE ADULT - SUBJECTIVE AND OBJECTIVE BOX
Infectious Diseases Follow Up:    Patient is a 83y old  Female who presents with a chief complaint of L 1st toe infection (26 Aug 2024 08:00)      Interval History/ROS:  Pt feeling well, no acute complaints.     Allergies  iodinated radiocontrast agents (Chills)  Prevacid (Chills)        ANTIMICROBIALS:  piperacillin/tazobactam IVPB.. 3.375 every 8 hours      Current Abx:     Previous Abx     OTHER MEDS:  MEDICATIONS  (STANDING):  acetaminophen     Tablet .. 650 every 6 hours PRN  amLODIPine   Tablet 7.5 daily  enoxaparin Injectable 40 every 24 hours  hydrochlorothiazide 25 daily  losartan 100 daily  melatonin 3 at bedtime PRN  metoprolol succinate ER 25 at bedtime      Vital Signs Last 24 Hrs  T(C): 36.6 (26 Aug 2024 05:38), Max: 36.6 (25 Aug 2024 22:11)  T(F): 97.9 (26 Aug 2024 05:38), Max: 97.9 (25 Aug 2024 22:11)  HR: 67 (26 Aug 2024 05:38) (65 - 72)  BP: 121/70 (26 Aug 2024 05:38) (116/68 - 132/64)  BP(mean): --  RR: 16 (26 Aug 2024 05:38) (16 - 18)  SpO2: 100% (26 Aug 2024 05:38) (100% - 100%)    Parameters below as of 26 Aug 2024 05:38  Patient On (Oxygen Delivery Method): room air        PHYSICAL EXAM:  GENERAL: NAD, well-developed  HEAD:  Atraumatic, Normocephalic  EYES: EOMI, conjunctiva and sclera clear  CHEST/LUNG: On RA, not in respiratory distress  EXTREMITIES:  L foot nail bed removed, wrapped, dressing c/d/i  PSYCH: AAOx3                          10.7   6.18  )-----------( 260      ( 26 Aug 2024 06:19 )             33.8       08-26    138  |  105  |  26<H>  ----------------------------<  100<H>  4.0   |  23  |  1.14    Ca    9.1      26 Aug 2024 06:19  Phos  3.2     08-26  Mg     2.10     08-26    TPro  6.6  /  Alb  3.4  /  TBili  0.2  /  DBili  x   /  AST  26  /  ALT  14  /  AlkPhos  64  08-26      Urinalysis Basic - ( 26 Aug 2024 06:19 )    Color: x / Appearance: x / SG: x / pH: x  Gluc: 100 mg/dL / Ketone: x  / Bili: x / Urobili: x   Blood: x / Protein: x / Nitrite: x   Leuk Esterase: x / RBC: x / WBC x   Sq Epi: x / Non Sq Epi: x / Bacteria: x        MICROBIOLOGY:  v  .Blood Blood  08-22-24   No growth at 72 Hours  --  --      .Blood Blood-Peripheral  08-22-24   No growth at 72 Hours  --  --      .Abscess left hallux  08-22-24   Few Pseudomonas aeruginosa  Moderate Enterococcus avium  Few Enterococcus faecalis  Moderate Bacteroides fragilis "Susceptibilities not performed"  Rare Proteus mirabilis  --  Pseudomonas aeruginosa  Enterococcus avium  Enterococcus faecalis  Proteus mirabilis                RADIOLOGY:

## 2024-08-26 NOTE — PROGRESS NOTE ADULT - PROBLEM SELECTOR PLAN 1
- L 1st toe infection - pain, drainage, mild erythema. Xray: Focal soft tissue swelling along the distal first phalanx.  Cortical erosion along the medial aspect of the first distal phalanx, concerning for osteomyelitis.  - ESR 36, CRP 8.6  - Seen by podiatry - nail plate removed. Wound to bone with purulence identified    > cw vanc, zosyn  > MRI L foot significant for osteomyelitis of L hallux  > ID consult for abx management, apprecaite recs  > f/u BCx x2, Wound culture       > WC growing Pseudomonas and Enterococcus spp.   > Podiatry planning for potential resection pending pt decision L 1st toe infection presenting with pain, drainage, mild erythema. Xray: Focal soft tissue swelling along the distal first phalanx. Cortical erosion along the medial aspect of the first distal phalanx, concerning for osteomyelitis. ESR 36, CRP 8.6.    - Seen by podiatry: nail plate removed. Wound to bone with purulence identified. Planning for potential resection pending pt decision   - MRI: Osteomyelitis of the first digit distal phalanx.  - WC: Pseudomonas and Enterococcus spp  - S/p Vanco (8/22 - 8/25) per ID recs  - Continue Zosyn (8/22 - ) per ID recs  - ID following, appreciate recs L 1st toe infection presenting with pain, drainage, mild erythema. Xray: Focal soft tissue swelling along the distal first phalanx. Cortical erosion along the medial aspect of the first distal phalanx, concerning for osteomyelitis. ESR 36, CRP 8.6.    - Seen by podiatry: nail plate removed. Wound to bone with purulence identified. Planning for potential resection pending pt decision   - MRI: Osteomyelitis of the first digit distal phalanx.  - WC: Pseudomonas and Enterococcus spp  - S/p Vanco (8/22 - 8/25) per ID recs  - Continue Zosyn (8/22 - ) per ID recs  - ID following, appreciate recs  - checking A1c in AM

## 2024-08-26 NOTE — PROGRESS NOTE ADULT - ASSESSMENT
83F with PMHx HTN, HLD, CKD, bilateral carpal tunnel syndrome, GERD presenting with L 1st toe pain, drainage x3 weeks. Admitted for L 1st toe osteomyelitis and undergoing consideration for partial amputation of L hallux.

## 2024-08-26 NOTE — PROGRESS NOTE ADULT - SUBJECTIVE AND OBJECTIVE BOX
Patient is a 83y old  Female who presents with a chief complaint of L 1st toe infection (25 Aug 2024 07:21)       INTERVAL HPI/OVERNIGHT EVENTS:  Patient seen and evaluated at bedside.  Pt is resting comfortable in NAD. Denies N/V/F/C.    Allergies    iodinated radiocontrast agents (Chills)  Prevacid (Chills)    Intolerances        Vital Signs Last 24 Hrs  T(C): 36.6 (26 Aug 2024 05:38), Max: 36.6 (25 Aug 2024 22:11)  T(F): 97.9 (26 Aug 2024 05:38), Max: 97.9 (25 Aug 2024 22:11)  HR: 67 (26 Aug 2024 05:38) (65 - 72)  BP: 121/70 (26 Aug 2024 05:38) (116/68 - 132/64)  BP(mean): --  RR: 16 (26 Aug 2024 05:38) (16 - 18)  SpO2: 100% (26 Aug 2024 05:38) (100% - 100%)    Parameters below as of 26 Aug 2024 05:38  Patient On (Oxygen Delivery Method): room air        LABS:                        10.6   5.93  )-----------( 237      ( 24 Aug 2024 08:03 )             32.6     08-25    141  |  104  |  22  ----------------------------<  112<H>  4.2   |  22  |  1.01    Ca    9.3      25 Aug 2024 09:30        Urinalysis Basic - ( 25 Aug 2024 09:30 )    Color: x / Appearance: x / SG: x / pH: x  Gluc: 112 mg/dL / Ketone: x  / Bili: x / Urobili: x   Blood: x / Protein: x / Nitrite: x   Leuk Esterase: x / RBC: x / WBC x   Sq Epi: x / Non Sq Epi: x / Bacteria: x      CAPILLARY BLOOD GLUCOSE          Lower Extremity Physical Exam:  Vascular: DP/PT 2/4, B/L, CFT < 3 seconds B/L, Temperature gradient warm to cool, B/L.   Neuro: Epicritic sensation intact to the level of the digits, B/L.  Musculoskeletal/Ortho: unremarkable  Skin: left dorsal hallux wound to bone at the nail bed and within the first interspace,  erythema globally about the first digit, strong malodor, purulent drainage, no crepitus    RADIOLOGY & ADDITIONAL TESTS:

## 2024-08-26 NOTE — PROGRESS NOTE ADULT - ASSESSMENT
This is an 82 y/o F w/ PMHx HTN, HLD, CKD, b/l CTS, GERD who presents to [facility] on 8/22 for L 1st toe pain w/ drainage for 3 weeks, following w/ outpatient podiatrist, now w/ malodor, pus from ulceration.   Pt recent seen at [facility], discharged w/ Clindamycin, now sent to [facility] to r/o OM by her podiatrist.   In the ER, pt was afebrile, VSS.   Labs w/o leukocytosis, ESR 36, CRP 8.6.   XR of distal 1st phalanx w/ OM    #L foot 1st distal phalanx OM w/ purulent drainage    Overall,  82 y/o F w/ PMHx HTN, HLD, CKD, b/l CTS, GERD presenting w/ L foot 1st toe drainage and wound, now w/ 1st phalanx OM. Seen by podiatry, L 1st toe tail removed, wound to bone w/ purulence. Would cover broadly for now and narrow based on Cx data. F/u MRI.   Cx w/ pseudomonas, proteus, E avium, E faecalis, B fragilis.     Plan:   1. Stop vancomycin, c/w Zosyn 3.375 g q8.   2. F/u MRI   3. F/u podiatry recommendations, pt currently deciding about surgery. Explained to patient best curative option for OM is surgery, and possible antibiotic after pending OR Cx and data.     Thank you for this consult. Inpatient ID team will follow.    Ankur Centeno M.D.  Attending Physician  Division of Infectious Diseases  Department of Medicine    Please contact through MS Teams message.  Office: [phone] (after 5 PM or weekend)

## 2024-08-26 NOTE — PROGRESS NOTE ADULT - PROBLEM SELECTOR PLAN 4
Lovenox 40mg qd  DASH/TLC diet DVT: Lovenox 40mg qd  Diet: DASH/TLC diet  Dispo: Pending DVT: SCD and encourage ambulation (IMPROVE 1)  Diet: DASH/TLC diet  Dispo: Pending

## 2024-08-27 ENCOUNTER — RESULT REVIEW (OUTPATIENT)
Age: 84
End: 2024-08-27

## 2024-08-27 LAB
A1C WITH ESTIMATED AVERAGE GLUCOSE RESULT: 5.9 % — HIGH (ref 4–5.6)
ALBUMIN SERPL ELPH-MCNC: 3.5 G/DL — SIGNIFICANT CHANGE UP (ref 3.3–5)
ALP SERPL-CCNC: 62 U/L — SIGNIFICANT CHANGE UP (ref 40–120)
ALT FLD-CCNC: 15 U/L — SIGNIFICANT CHANGE UP (ref 4–33)
ANION GAP SERPL CALC-SCNC: 9 MMOL/L — SIGNIFICANT CHANGE UP (ref 7–14)
APTT BLD: 31.2 SEC — SIGNIFICANT CHANGE UP (ref 24.5–35.6)
AST SERPL-CCNC: 25 U/L — SIGNIFICANT CHANGE UP (ref 4–32)
BASOPHILS # BLD AUTO: 0.02 K/UL — SIGNIFICANT CHANGE UP (ref 0–0.2)
BASOPHILS NFR BLD AUTO: 0.3 % — SIGNIFICANT CHANGE UP (ref 0–2)
BILIRUB SERPL-MCNC: <0.2 MG/DL — SIGNIFICANT CHANGE UP (ref 0.2–1.2)
BUN SERPL-MCNC: 27 MG/DL — HIGH (ref 7–23)
CALCIUM SERPL-MCNC: 9.4 MG/DL — SIGNIFICANT CHANGE UP (ref 8.4–10.5)
CHLORIDE SERPL-SCNC: 106 MMOL/L — SIGNIFICANT CHANGE UP (ref 98–107)
CO2 SERPL-SCNC: 25 MMOL/L — SIGNIFICANT CHANGE UP (ref 22–31)
CREAT SERPL-MCNC: 1.1 MG/DL — SIGNIFICANT CHANGE UP (ref 0.5–1.3)
EGFR: 49 ML/MIN/1.73M2 — LOW
EOSINOPHIL # BLD AUTO: 0.37 K/UL — SIGNIFICANT CHANGE UP (ref 0–0.5)
EOSINOPHIL NFR BLD AUTO: 6.4 % — HIGH (ref 0–6)
ESTIMATED AVERAGE GLUCOSE: 123 — SIGNIFICANT CHANGE UP
GLUCOSE SERPL-MCNC: 96 MG/DL — SIGNIFICANT CHANGE UP (ref 70–99)
HCT VFR BLD CALC: 32.4 % — LOW (ref 34.5–45)
HGB BLD-MCNC: 10.4 G/DL — LOW (ref 11.5–15.5)
IANC: 3.63 K/UL — SIGNIFICANT CHANGE UP (ref 1.8–7.4)
IMM GRANULOCYTES NFR BLD AUTO: 0.3 % — SIGNIFICANT CHANGE UP (ref 0–0.9)
INR BLD: 0.95 RATIO — SIGNIFICANT CHANGE UP (ref 0.85–1.18)
LYMPHOCYTES # BLD AUTO: 1.12 K/UL — SIGNIFICANT CHANGE UP (ref 1–3.3)
LYMPHOCYTES # BLD AUTO: 19.2 % — SIGNIFICANT CHANGE UP (ref 13–44)
MCHC RBC-ENTMCNC: 29.6 PG — SIGNIFICANT CHANGE UP (ref 27–34)
MCHC RBC-ENTMCNC: 32.1 GM/DL — SIGNIFICANT CHANGE UP (ref 32–36)
MCV RBC AUTO: 92.3 FL — SIGNIFICANT CHANGE UP (ref 80–100)
MONOCYTES # BLD AUTO: 0.66 K/UL — SIGNIFICANT CHANGE UP (ref 0–0.9)
MONOCYTES NFR BLD AUTO: 11.3 % — SIGNIFICANT CHANGE UP (ref 2–14)
NEUTROPHILS # BLD AUTO: 3.63 K/UL — SIGNIFICANT CHANGE UP (ref 1.8–7.4)
NEUTROPHILS NFR BLD AUTO: 62.5 % — SIGNIFICANT CHANGE UP (ref 43–77)
NRBC # BLD: 0 /100 WBCS — SIGNIFICANT CHANGE UP (ref 0–0)
NRBC # FLD: 0 K/UL — SIGNIFICANT CHANGE UP (ref 0–0)
PLATELET # BLD AUTO: 232 K/UL — SIGNIFICANT CHANGE UP (ref 150–400)
POTASSIUM SERPL-MCNC: 4.2 MMOL/L — SIGNIFICANT CHANGE UP (ref 3.5–5.3)
POTASSIUM SERPL-SCNC: 4.2 MMOL/L — SIGNIFICANT CHANGE UP (ref 3.5–5.3)
PROT SERPL-MCNC: 6.8 G/DL — SIGNIFICANT CHANGE UP (ref 6–8.3)
PROTHROM AB SERPL-ACNC: 10.7 SEC — SIGNIFICANT CHANGE UP (ref 9.5–13)
RBC # BLD: 3.51 M/UL — LOW (ref 3.8–5.2)
RBC # FLD: 14.4 % — SIGNIFICANT CHANGE UP (ref 10.3–14.5)
SODIUM SERPL-SCNC: 140 MMOL/L — SIGNIFICANT CHANGE UP (ref 135–145)
WBC # BLD: 5.82 K/UL — SIGNIFICANT CHANGE UP (ref 3.8–10.5)
WBC # FLD AUTO: 5.82 K/UL — SIGNIFICANT CHANGE UP (ref 3.8–10.5)

## 2024-08-27 PROCEDURE — 99232 SBSQ HOSP IP/OBS MODERATE 35: CPT

## 2024-08-27 PROCEDURE — 93306 TTE W/DOPPLER COMPLETE: CPT | Mod: 26

## 2024-08-27 RX ADMIN — Medication 20 MILLIEQUIVALENT(S): at 06:17

## 2024-08-27 RX ADMIN — AMLODIPINE BESYLATE 7.5 MILLIGRAM(S): 10 TABLET ORAL at 06:17

## 2024-08-27 RX ADMIN — PIPERACILLIN SODIUM AND TAZOBACTAM SODIUM 25 GRAM(S): 3; .375 INJECTION, POWDER, FOR SOLUTION INTRAVENOUS at 06:16

## 2024-08-27 RX ADMIN — PIPERACILLIN SODIUM AND TAZOBACTAM SODIUM 25 GRAM(S): 3; .375 INJECTION, POWDER, FOR SOLUTION INTRAVENOUS at 12:42

## 2024-08-27 RX ADMIN — PIPERACILLIN SODIUM AND TAZOBACTAM SODIUM 25 GRAM(S): 3; .375 INJECTION, POWDER, FOR SOLUTION INTRAVENOUS at 21:07

## 2024-08-27 RX ADMIN — LOSARTAN POTASSIUM 100 MILLIGRAM(S): 50 TABLET ORAL at 06:17

## 2024-08-27 NOTE — PROGRESS NOTE ADULT - ASSESSMENT
83F presents for left hallux wound  -Pt was seen and evaluated  -Afebrile, no leukocytosis  -Left dorsal hallux wound to bone at the nail bed and within the first interspace,  erythema globally about the first digit, strong malodor, purulent drainage, no crepitus  -Left foot x-ray: Focal soft tissue swelling of the distal first phalanx with underlying cortical erosion of the tuft. Findings concerning for osteomyelitis.  -Left foot MRI: OM of first distal phalanx  -Left foot wound culture: Enterococcus faecalis/avium, Proteus mirabilis, Pseudomonas, few Bacteriodes   -ID recs, appreciated   - Extensive discussion with patient regarding the necessity for the amputation as the treatment of the infected bone, and antibiotics is the not optimal treatment with possible complication of sepsis loss of medina and life loss, pt showed verbal understanding and stated " She is not ready for the surgery tomorrow" as she needs more time to decide   -Pod Plan: local wound care vs left foot partial 1st ray resection pending pt decision  -Discussed with attending

## 2024-08-27 NOTE — PROGRESS NOTE ADULT - PROBLEM SELECTOR PLAN 2
Home meds:  losartan/HCTZ 100-25mg qd, metoprolol succinate 25mg qhs, amlodipine 7.5mg qd    - Cont home losartan 100mg + HCTZ 25mg + Toprol 25         - Hold HCTZ and losartan if procedure planned with podiatry  - Cw home KCl, monitor K while not receiving diuretic - likely will not require K supplementation while off diuresis

## 2024-08-27 NOTE — PROGRESS NOTE ADULT - SUBJECTIVE AND OBJECTIVE BOX
Patient is a 83y old  Female who presents with a chief complaint of L 1st toe infection (27 Aug 2024 07:34)       INTERVAL HPI/OVERNIGHT EVENTS:  Patient seen and evaluated at bedside.  Pt is resting comfortable in NAD. Denies N/V/F/C.    Allergies    iodinated radiocontrast agents (Chills)  Prevacid (Chills)    Intolerances        Vital Signs Last 24 Hrs  T(C): 36.4 (27 Aug 2024 05:51), Max: 36.9 (26 Aug 2024 21:43)  T(F): 97.6 (27 Aug 2024 05:51), Max: 98.5 (26 Aug 2024 21:43)  HR: 67 (27 Aug 2024 05:51) (59 - 73)  BP: 134/65 (27 Aug 2024 05:51) (105/67 - 134/65)  BP(mean): --  RR: 16 (27 Aug 2024 05:51) (16 - 17)  SpO2: 98% (27 Aug 2024 05:51) (98% - 100%)    Parameters below as of 27 Aug 2024 05:51  Patient On (Oxygen Delivery Method): room air        LABS:                        10.4   5.82  )-----------( 232      ( 27 Aug 2024 06:40 )             32.4     08-27    140  |  106  |  27<H>  ----------------------------<  96  4.2   |  25  |  1.10    Ca    9.4      27 Aug 2024 06:40  Phos  3.2     08-26  Mg     2.10     08-26    TPro  6.8  /  Alb  3.5  /  TBili  <0.2  /  DBili  x   /  AST  25  /  ALT  15  /  AlkPhos  62  08-27    PT/INR - ( 27 Aug 2024 06:40 )   PT: 10.7 sec;   INR: 0.95 ratio         PTT - ( 27 Aug 2024 06:40 )  PTT:31.2 sec  Urinalysis Basic - ( 27 Aug 2024 06:40 )    Color: x / Appearance: x / SG: x / pH: x  Gluc: 96 mg/dL / Ketone: x  / Bili: x / Urobili: x   Blood: x / Protein: x / Nitrite: x   Leuk Esterase: x / RBC: x / WBC x   Sq Epi: x / Non Sq Epi: x / Bacteria: x      CAPILLARY BLOOD GLUCOSE          Lower Extremity Physical Exam:  Vascular: DP/PT 2/4, B/L, CFT < 3 seconds B/L, Temperature gradient warm to cool, B/L.   Neuro: Epicritic sensation intact to the level of the digits, B/L.  Musculoskeletal/Ortho: unremarkable  Skin: left dorsal hallux wound to bone at the nail bed and within the first interspace,  erythema globally about the first digit, strong malodor, purulent drainage, no crepitus    RADIOLOGY & ADDITIONAL TESTS:

## 2024-08-27 NOTE — PROGRESS NOTE ADULT - PROBLEM SELECTOR PLAN 1
L 1st toe infection presenting with pain, drainage, mild erythema. Xray: Focal soft tissue swelling along the distal first phalanx. Cortical erosion along the medial aspect of the first distal phalanx, concerning for osteomyelitis. ESR 36, CRP 8.6.    - Seen by podiatry: nail plate removed. Wound to bone with purulence identified. Planning for potential resection pending pt decision   - MRI: Osteomyelitis of the first digit distal phalanx.  - WC: Pseudomonas and Enterococcus spp  - S/p Vanco (8/22 - 8/25) per ID recs  - Continue Zosyn (8/22 - ) per ID recs  - ID following, appreciate recs  - checking A1c in AM

## 2024-08-27 NOTE — PROGRESS NOTE ADULT - ASSESSMENT
This is an 84 y/o F w/ PMHx HTN, HLD, CKD, b/l CTS, GERD who presents to [facility] on 8/22 for L 1st toe pain w/ drainage for 3 weeks, following w/ outpatient podiatrist, now w/ malodor, pus from ulceration.   Pt recent seen at [facility], discharged w/ Clindamycin, now sent to [facility] to r/o OM by her podiatrist.   In the ER, pt was afebrile, VSS.   Labs w/o leukocytosis, ESR 36, CRP 8.6.   XR of distal 1st phalanx w/ OM    #L foot 1st distal phalanx OM w/ purulent drainage    Overall,  84 y/o F w/ PMHx HTN, HLD, CKD, b/l CTS, GERD presenting w/ L foot 1st toe drainage and wound, now w/ 1st phalanx OM. Seen by podiatry, L 1st toe tail removed, wound to bone w/ purulence. Would cover broadly for now and narrow based on Cx data. F/u MRI.   Cx w/ pseudomonas, proteus, E avium, E faecalis, B fragilis.     Plan:   1. C/w Zosyn 3.375 g q8.   2. F/u MRI   3. F/u podiatry recommendations, pt currently deciding about surgery. Explained to patient best curative option for OM is surgery, and possible antibiotic after pending OR Cx and data.     Thank you for this consult. Inpatient ID team will follow.    Ankur Centeno M.D.  Attending Physician  Division of Infectious Diseases  Department of Medicine    Please contact through MS Teams message.  Office: [phone] (after 5 PM or weekend)

## 2024-08-27 NOTE — PROGRESS NOTE ADULT - SUBJECTIVE AND OBJECTIVE BOX
Infectious Diseases Follow Up:    Patient is a 83y old  Female who presents with a chief complaint of L 1st toe infection (27 Aug 2024 09:13)      Interval History/ROS:  Pt is doing well, no acute complaints.     Allergies  iodinated radiocontrast agents (Chills)  Prevacid (Chills)        ANTIMICROBIALS:  piperacillin/tazobactam IVPB.. 3.375 every 8 hours      Current Abx:     Previous Abx     OTHER MEDS:  MEDICATIONS  (STANDING):  acetaminophen     Tablet .. 650 every 6 hours PRN  amLODIPine   Tablet 7.5 daily  hydrochlorothiazide 25 daily  losartan 100 daily  melatonin 3 at bedtime PRN  metoprolol succinate ER 25 at bedtime      Vital Signs Last 24 Hrs  T(C): 36.4 (27 Aug 2024 05:51), Max: 36.9 (26 Aug 2024 21:43)  T(F): 97.6 (27 Aug 2024 05:51), Max: 98.5 (26 Aug 2024 21:43)  HR: 67 (27 Aug 2024 05:51) (59 - 73)  BP: 134/65 (27 Aug 2024 05:51) (105/67 - 134/65)  BP(mean): --  RR: 16 (27 Aug 2024 05:51) (16 - 17)  SpO2: 98% (27 Aug 2024 05:51) (98% - 100%)    Parameters below as of 27 Aug 2024 05:51  Patient On (Oxygen Delivery Method): room air        PHYSICAL EXAM:  GENERAL: NAD, well-developed  HEAD:  Atraumatic, Normocephalic  EYES: EOMI, conjunctiva and sclera clear  CHEST/LUNG: On RA, not in respiratory distress  EXTREMITIES:  L foot nail bed removed, wrapped, dressing c/d/i  PSYCH: AAOx3                        10.4   5.82  )-----------( 232      ( 27 Aug 2024 06:40 )             32.4       08-27    140  |  106  |  27<H>  ----------------------------<  96  4.2   |  25  |  1.10    Ca    9.4      27 Aug 2024 06:40  Phos  3.2     08-26  Mg     2.10     08-26    TPro  6.8  /  Alb  3.5  /  TBili  <0.2  /  DBili  x   /  AST  25  /  ALT  15  /  AlkPhos  62  08-27      Urinalysis Basic - ( 27 Aug 2024 06:40 )    Color: x / Appearance: x / SG: x / pH: x  Gluc: 96 mg/dL / Ketone: x  / Bili: x / Urobili: x   Blood: x / Protein: x / Nitrite: x   Leuk Esterase: x / RBC: x / WBC x   Sq Epi: x / Non Sq Epi: x / Bacteria: x        MICROBIOLOGY:  v  .Blood Blood  08-22-24   No growth at 4 days  --  --      .Blood Blood-Peripheral  08-22-24   No growth at 4 days  --  --      .Abscess left hallux  08-22-24   Few Pseudomonas aeruginosa  Moderate Enterococcus avium  Few Enterococcus faecalis  Moderate Bacteroides fragilis "Susceptibilities not performed"  Rare Proteus mirabilis  --  Pseudomonas aeruginosa  Enterococcus avium  Enterococcus faecalis  Proteus mirabilis                RADIOLOGY:

## 2024-08-27 NOTE — PROGRESS NOTE ADULT - SUBJECTIVE AND OBJECTIVE BOX
Patient is a 83y old  Female who presents with a chief complaint of L 1st toe infection (24 Aug 2024 09:25)      SUBJECTIVE / OVERNIGHT EVENTS:   - no Acute overnight events  - VS WNL  - S/p Vanc, continuing Zosyn  - Patient reported interested in surgery last night, will f/u      PHYSICAL EXAM:  GENERAL: NAD, lying in bed comfortably  HEAD:  Atraumatic, normocephalic  EYES: EOMI, , conjunctiva clear, no conjunctival pallor, anicteric sclera  HEART: Regular rate and rhythm, Normal S1 S2, no murmurs, rubs, or gallops  LUNGS: Unlabored respirations. Clear to ascultation bilaterally, no crackles, wheezing, or rhonchi  ABDOMEN: Soft, nondistended, nontender, no rebound or guarding, bowel sounds presents  EXTREMITIES: Warm extremities, no clubbing, cyanosis, or edema, peripheral pulses 2+ bilaterally. (+) L hallux dressing in place - no significant purulent drainage from nail be upon inspection.   MUSCULOSKELETAL: No joint swelling or tenderness to palpation  NEURO: moves all limbs spontaneously  SKIN: No rashes      VS  T(C): 36.4 (08-27-24 @ 05:51), Max: 36.9 (08-26-24 @ 21:43)  HR: 67 (08-27-24 @ 05:51) (59 - 73)  BP: 134/65 (08-27-24 @ 05:51) (105/67 - 134/65)  RR: 16 (08-27-24 @ 05:51) (16 - 17)  SpO2: 98% (08-27-24 @ 05:51) (98% - 100%)    LABS (available at time of writing 08-27-24 @ 07:35):                         10.4   5.82  )-----------( 232      ( 27 Aug 2024 06:40 )             32.4     08-26    138  |  105  |  26<H>  ----------------------------<  100<H>  4.0   |  23  |  1.14    Ca    9.1      26 Aug 2024 06:19  Phos  3.2     08-26  Mg     2.10     08-26    TPro  6.6  /  Alb  3.4  /  TBili  0.2  /  DBili  x   /  AST  26 /  ALT  14  /  AlkPhos  64  08-26    PT/INR - ( 26 Aug 2024 06:19 )   PT: 10.9 sec;   INR: 0.97 ratio         PTT - ( 26 Aug 2024 06:19 )  PTT:34.5 sec  Urinalysis Basic - ( 26 Aug 2024 06:19 )    Color: x / Appearance: x / SG: x / pH: x  Gluc: 100 mg/dL / Ketone: x  / Bili: x / Urobili: x   Blood: x / Protein: x / Nitrite: x   Leuk Esterase: x / RBC: x / WBC x   Sq Epi: x / Non Sq Epi: x / Bacteria: x          Culture - Blood (collected 08-22-24 @ 22:30)  Source: .Blood Blood  Preliminary Report (08-27-24 @ 03:00):    No growth at 4 days    Culture - Blood (collected 08-22-24 @ 22:25)  Source: .Blood Blood-Peripheral  Preliminary Report (08-27-24 @ 03:00):    No growth at 4 days    Culture - Abscess with Gram Stain (collected 08-22-24 @ 22:00)  Source: .Abscess left hallux  Gram Stain (08-23-24 @ 23:29):    No polymorphonuclear cells seen per low power field    Rare Gram Negative Rods per oil power field  Final Report (08-26-24 @ 10:14):    Few Pseudomonas aeruginosa    Moderate Enterococcus avium    Few Enterococcus faecalis    Moderate Bacteroides fragilis "Susceptibilities not performed"    Rare Proteus mirabilis  Organism: Pseudomonas aeruginosa  Enterococcus avium  Enterococcus faecalis  Proteus mirabilis (08-26-24 @ 10:14)  Organism: Proteus mirabilis (08-26-24 @ 10:14)      -  Levofloxacin: S <=0.5      -  Tobramycin: S <=2      -  Aztreonam: S <=4      -  Gentamicin: S <=2      -  Cefazolin: S <=2      -  Cefepime: S <=2      -  Piperacillin/Tazobactam: S <=8      -  Ciprofloxacin: S <=0.25      -  Ceftriaxone: S <=1      -  Ampicillin: S <=8 These ampicillin results predict results for amoxicillin      Method Type: DAWIT      -  Meropenem: S <=1      -  Ampicillin/Sulbactam: S <=4/2      -  Cefoxitin: S <=8      -  Amoxicillin/Clavulanic Acid: S <=8/4      -  Trimethoprim/Sulfamethoxazole: S <=0.5/9.5      -  Ertapenem: S <=0.5  Organism: Enterococcus faecalis (08-26-24 @ 10:14)      -  Vancomycin: S 2      -  Ampicillin: S <=2 Predicts results to ampicillin/sulbactam, amoxacillin-clavulanate and  piperacillin-tazobactam.      Method Type: DAWIT  Organism: Enterococcus avium (08-26-24 @ 10:14)      -  Vancomycin: S 0.5      -  Ampicillin: S <=2 Predicts results to ampicillin/sulbactam, amoxacillin-clavulanate and  piperacillin-tazobactam.      Method Type: DAWIT  Organism: Pseudomonas aeruginosa (08-26-24 @ 10:14)      -  Levofloxacin: S <=0.5      -  Aztreonam: S 8      -  Cefepime: S <=2      -  Piperacillin/Tazobactam: S <=8      -  Ciprofloxacin: S <=0.25      -  Imipenem: S <=1      Method Type: DAWIT      -  Meropenem: S <=1      -  Ceftazidime: S <=1        Medications:   acetaminophen     Tablet .. 650 milliGRAM(s) Oral every 6 hours PRN  amLODIPine   Tablet 7.5 milliGRAM(s) Oral daily  hydrochlorothiazide 25 milliGRAM(s) Oral daily  losartan 100 milliGRAM(s) Oral daily  melatonin 3 milliGRAM(s) Oral at bedtime PRN  metoprolol succinate ER 25 milliGRAM(s) Oral at bedtime  piperacillin/tazobactam IVPB.. 3.375 Gram(s) IV Intermittent every 8 hours      RADIOLOGY, EKG & ADDITIONAL TESTS: Reviewed.    Patient is a 83y old  Female who presents with a chief complaint of L 1st toe infection (24 Aug 2024 09:25)      SUBJECTIVE / OVERNIGHT EVENTS:   - no Acute overnight events  - VS WNL  - S/p Vanc, continuing Zosyn  - Patient says she needs her daughter to speak with ID before making a decision on surgery      PHYSICAL EXAM:  GENERAL: NAD, lying in bed comfortably  HEAD:  Atraumatic, normocephalic  EYES: EOMI, , conjunctiva clear, no conjunctival pallor, anicteric sclera  HEART: Regular rate and rhythm, Normal S1 S2, no murmurs, rubs, or gallops  LUNGS: Unlabored respirations. Clear to ascultation bilaterally, no crackles, wheezing, or rhonchi  ABDOMEN: Soft, nondistended, nontender, no rebound or guarding, bowel sounds presents  EXTREMITIES: Warm extremities, no clubbing, cyanosis, or edema, peripheral pulses 2+ bilaterally. (+) L hallux dressing in place - no significant purulent drainage from nail be upon inspection.   MUSCULOSKELETAL: No joint swelling or tenderness to palpation  NEURO: moves all limbs spontaneously  SKIN: No rashes      VS  T(C): 36.4 (08-27-24 @ 05:51), Max: 36.9 (08-26-24 @ 21:43)  HR: 67 (08-27-24 @ 05:51) (59 - 73)  BP: 134/65 (08-27-24 @ 05:51) (105/67 - 134/65)  RR: 16 (08-27-24 @ 05:51) (16 - 17)  SpO2: 98% (08-27-24 @ 05:51) (98% - 100%)    LABS (available at time of writing 08-27-24 @ 07:35):                         10.4   5.82  )-----------( 232      ( 27 Aug 2024 06:40 )             32.4     08-26    138  |  105  |  26<H>  ----------------------------<  100<H>  4.0   |  23  |  1.14    Ca    9.1      26 Aug 2024 06:19  Phos  3.2     08-26  Mg     2.10     08-26    TPro  6.6  /  Alb  3.4  /  TBili  0.2  /  DBili  x   /  AST  26  /  ALT  14  /  AlkPhos  64  08-26    PT/INR - ( 26 Aug 2024 06:19 )   PT: 10.9 sec;   INR: 0.97 ratio         PTT - ( 26 Aug 2024 06:19 )  PTT:34.5 sec  Urinalysis Basic - ( 26 Aug 2024 06:19 )    Color: x / Appearance: x / SG: x / pH: x  Gluc: 100 mg/dL / Ketone: x  / Bili: x / Urobili: x   Blood: x / Protein: x / Nitrite: x   Leuk Esterase: x / RBC: x / WBC x   Sq Epi: x / Non Sq Epi: x / Bacteria: x          Culture - Blood (collected 08-22-24 @ 22:30)  Source: .Blood Blood  Preliminary Report (08-27-24 @ 03:00):    No growth at 4 days    Culture - Blood (collected 08-22-24 @ 22:25)  Source: .Blood Blood-Peripheral  Preliminary Report (08-27-24 @ 03:00):    No growth at 4 days    Culture - Abscess with Gram Stain (collected 08-22-24 @ 22:00)  Source: .Abscess left hallux  Gram Stain (08-23-24 @ 23:29):    No polymorphonuclear cells seen per low power field    Rare Gram Negative Rods per oil power field  Final Report (08-26-24 @ 10:14):    Few Pseudomonas aeruginosa    Moderate Enterococcus avium    Few Enterococcus faecalis    Moderate Bacteroides fragilis "Susceptibilities not performed"    Rare Proteus mirabilis  Organism: Pseudomonas aeruginosa  Enterococcus avium  Enterococcus faecalis  Proteus mirabilis (08-26-24 @ 10:14)  Organism: Proteus mirabilis (08-26-24 @ 10:14)      -  Levofloxacin: S <=0.5      -  Tobramycin: S <=2      -  Aztreonam: S <=4      -  Gentamicin: S <=2      -  Cefazolin: S <=2      -  Cefepime: S <=2      -  Piperacillin/Tazobactam: S <=8      -  Ciprofloxacin: S <=0.25      -  Ceftriaxone: S <=1      -  Ampicillin: S <=8 These ampicillin results predict results for amoxicillin      Method Type: DAWIT      -  Meropenem: S <=1      -  Ampicillin/Sulbactam: S <=4/2      -  Cefoxitin: S <=8      -  Amoxicillin/Clavulanic Acid: S <=8/4      -  Trimethoprim/Sulfamethoxazole: S <=0.5/9.5      -  Ertapenem: S <=0.5  Organism: Enterococcus faecalis (08-26-24 @ 10:14)      -  Vancomycin: S 2      -  Ampicillin: S <=2 Predicts results to ampicillin/sulbactam, amoxacillin-clavulanate and  piperacillin-tazobactam.      Method Type: DAWIT  Organism: Enterococcus avium (08-26-24 @ 10:14)      -  Vancomycin: S 0.5      -  Ampicillin: S <=2 Predicts results to ampicillin/sulbactam, amoxacillin-clavulanate and  piperacillin-tazobactam.      Method Type: DAWIT  Organism: Pseudomonas aeruginosa (08-26-24 @ 10:14)      -  Levofloxacin: S <=0.5      -  Aztreonam: S 8      -  Cefepime: S <=2      -  Piperacillin/Tazobactam: S <=8      -  Ciprofloxacin: S <=0.25      -  Imipenem: S <=1      Method Type: DAWIT      -  Meropenem: S <=1      -  Ceftazidime: S <=1        Medications:   acetaminophen     Tablet .. 650 milliGRAM(s) Oral every 6 hours PRN  amLODIPine   Tablet 7.5 milliGRAM(s) Oral daily  hydrochlorothiazide 25 milliGRAM(s) Oral daily  losartan 100 milliGRAM(s) Oral daily  melatonin 3 milliGRAM(s) Oral at bedtime PRN  metoprolol succinate ER 25 milliGRAM(s) Oral at bedtime  piperacillin/tazobactam IVPB.. 3.375 Gram(s) IV Intermittent every 8 hours      RADIOLOGY, EKG & ADDITIONAL TESTS: Reviewed.

## 2024-08-28 LAB
CULTURE RESULTS: SIGNIFICANT CHANGE UP
CULTURE RESULTS: SIGNIFICANT CHANGE UP
SPECIMEN SOURCE: SIGNIFICANT CHANGE UP
SPECIMEN SOURCE: SIGNIFICANT CHANGE UP

## 2024-08-28 PROCEDURE — 99232 SBSQ HOSP IP/OBS MODERATE 35: CPT

## 2024-08-28 RX ADMIN — METOPROLOL TARTRATE 25 MILLIGRAM(S): 100 TABLET ORAL at 22:00

## 2024-08-28 RX ADMIN — PIPERACILLIN SODIUM AND TAZOBACTAM SODIUM 25 GRAM(S): 3; .375 INJECTION, POWDER, FOR SOLUTION INTRAVENOUS at 05:49

## 2024-08-28 RX ADMIN — AMLODIPINE BESYLATE 7.5 MILLIGRAM(S): 10 TABLET ORAL at 05:52

## 2024-08-28 RX ADMIN — LOSARTAN POTASSIUM 100 MILLIGRAM(S): 50 TABLET ORAL at 09:14

## 2024-08-28 RX ADMIN — PIPERACILLIN SODIUM AND TAZOBACTAM SODIUM 25 GRAM(S): 3; .375 INJECTION, POWDER, FOR SOLUTION INTRAVENOUS at 20:15

## 2024-08-28 RX ADMIN — PIPERACILLIN SODIUM AND TAZOBACTAM SODIUM 25 GRAM(S): 3; .375 INJECTION, POWDER, FOR SOLUTION INTRAVENOUS at 14:39

## 2024-08-28 NOTE — PROGRESS NOTE ADULT - SUBJECTIVE AND OBJECTIVE BOX
Patient is a 83y old  Female who presents with a chief complaint of L 1st toe infection (28 Aug 2024 07:40)       INTERVAL HPI/OVERNIGHT EVENTS:  Patient seen and evaluated at bedside.  Pt is resting comfortable in NAD. Denies N/V/F/C.      Allergies    iodinated radiocontrast agents (Chills)  Prevacid (Chills)    Intolerances        Vital Signs Last 24 Hrs  T(C): 36.3 (28 Aug 2024 05:24), Max: 36.4 (27 Aug 2024 22:47)  T(F): 97.4 (28 Aug 2024 05:24), Max: 97.6 (27 Aug 2024 22:47)  HR: 64 (28 Aug 2024 05:24) (64 - 79)  BP: 112/55 (28 Aug 2024 05:24) (104/56 - 134/68)  BP(mean): --  RR: 18 (28 Aug 2024 05:24) (16 - 18)  SpO2: 100% (28 Aug 2024 05:24) (97% - 100%)    Parameters below as of 28 Aug 2024 05:24  Patient On (Oxygen Delivery Method): room air        LABS:                        10.4   5.82  )-----------( 232      ( 27 Aug 2024 06:40 )             32.4     08-27    140  |  106  |  27<H>  ----------------------------<  96  4.2   |  25  |  1.10    Ca    9.4      27 Aug 2024 06:40    TPro  6.8  /  Alb  3.5  /  TBili  <0.2  /  DBili  x   /  AST  25  /  ALT  15  /  AlkPhos  62  08-27    PT/INR - ( 27 Aug 2024 06:40 )   PT: 10.7 sec;   INR: 0.95 ratio         PTT - ( 27 Aug 2024 06:40 )  PTT:31.2 sec  Urinalysis Basic - ( 27 Aug 2024 06:40 )    Color: x / Appearance: x / SG: x / pH: x  Gluc: 96 mg/dL / Ketone: x  / Bili: x / Urobili: x   Blood: x / Protein: x / Nitrite: x   Leuk Esterase: x / RBC: x / WBC x   Sq Epi: x / Non Sq Epi: x / Bacteria: x      CAPILLARY BLOOD GLUCOSE          Lower Extremity Physical Exam:  Vascular: DP/PT 2/4, B/L, CFT < 3 seconds B/L, Temperature gradient warm to cool, B/L.   Neuro: Epicritic sensation intact to the level of the digits, B/L.  Musculoskeletal/Ortho: unremarkable  Skin: left dorsal hallux wound to bone at the nail bed and within the first interspace,  erythema globally about the first digit, strong malodor, purulent drainage, no crepitus      RADIOLOGY & ADDITIONAL TESTS:

## 2024-08-28 NOTE — PROGRESS NOTE ADULT - SUBJECTIVE AND OBJECTIVE BOX
Infectious Diseases Follow Up:    Patient is a 83y old  Female who presents with a chief complaint of L 1st toe infection (28 Aug 2024 09:51)      Interval History/ROS:  Pt is feeling okay, not acute events, undecided re: surgery, wants to talk to her daughter. Concern re: anesthesia.     Allergies  iodinated radiocontrast agents (Chills)  Prevacid (Chills)        ANTIMICROBIALS:  piperacillin/tazobactam IVPB.. 3.375 every 8 hours      Current Abx:     Previous Abx     OTHER MEDS:  MEDICATIONS  (STANDING):  acetaminophen     Tablet .. 650 every 6 hours PRN  amLODIPine   Tablet 7.5 daily  hydrochlorothiazide 25 daily  losartan 100 daily  melatonin 3 at bedtime PRN  metoprolol succinate ER 25 at bedtime      Vital Signs Last 24 Hrs  T(C): 36.3 (28 Aug 2024 05:24), Max: 36.4 (27 Aug 2024 22:47)  T(F): 97.4 (28 Aug 2024 05:24), Max: 97.6 (27 Aug 2024 22:47)  HR: 70 (28 Aug 2024 09:14) (64 - 79)  BP: 133/68 (28 Aug 2024 09:14) (104/56 - 134/68)  BP(mean): --  RR: 18 (28 Aug 2024 09:14) (16 - 18)  SpO2: 100% (28 Aug 2024 09:14) (97% - 100%)    Parameters below as of 28 Aug 2024 09:14  Patient On (Oxygen Delivery Method): room air      PHYSICAL EXAM:  GENERAL: NAD, well-developed  HEAD:  Atraumatic, Normocephalic  EYES: EOMI, conjunctiva and sclera clear  CHEST/LUNG: On RA, not in respiratory distress  EXTREMITIES:  L foot nail bed removed, wrapped, dressing c/d/i  PSYCH: AAOx3                                   10.4   5.82  )-----------( 232      ( 27 Aug 2024 06:40 )             32.4       08-27    140  |  106  |  27<H>  ----------------------------<  96  4.2   |  25  |  1.10    Ca    9.4      27 Aug 2024 06:40    TPro  6.8  /  Alb  3.5  /  TBili  <0.2  /  DBili  x   /  AST  25  /  ALT  15  /  AlkPhos  62  08-27      Urinalysis Basic - ( 27 Aug 2024 06:40 )    Color: x / Appearance: x / SG: x / pH: x  Gluc: 96 mg/dL / Ketone: x  / Bili: x / Urobili: x   Blood: x / Protein: x / Nitrite: x   Leuk Esterase: x / RBC: x / WBC x   Sq Epi: x / Non Sq Epi: x / Bacteria: x        MICROBIOLOGY:  v  .Blood Blood  08-22-24   No growth at 5 days  --  --      .Blood Blood-Peripheral  08-22-24   No growth at 5 days  --  --      .Abscess left hallux  08-22-24   Few Pseudomonas aeruginosa  Moderate Enterococcus avium  Few Enterococcus faecalis  Moderate Bacteroides fragilis "Susceptibilities not performed"  Rare Proteus mirabilis  --  Pseudomonas aeruginosa  Enterococcus avium  Enterococcus faecalis  Proteus mirabilis                RADIOLOGY:  < from: MR Foot w/wo IV Cont, Left (08.23.24 @ 14:24) >  INTERPRETATION:  There is T1 hypointense/STIR hyperintense signal abnormality with   enhancement involving the distal phalanx of the first digit with   overlying ulcer, findings consistent osteomyelitis.There is moderate   midfoot arthropathy with scattered subchondral edema present at the   tarsometatarsal joints and talonavicular joint.There is no localized   abscess. There is no acute fracture or AVN. There is edema within the   intrinsic muscles of the foot. There is dorsal foot soft tissue edema.    IMPRESSION:  Osteomyelitis of the first digit distal phalanx.    < end of copied text >

## 2024-08-28 NOTE — PROGRESS NOTE ADULT - ASSESSMENT
This is an 82 y/o F w/ PMHx HTN, HLD, CKD, b/l CTS, GERD who presents to [facility] on 8/22 for L 1st toe pain w/ drainage for 3 weeks, following w/ outpatient podiatrist, now w/ malodor, pus from ulceration.   Pt recent seen at [facility], discharged w/ Clindamycin, now sent to [facility] to r/o OM by her podiatrist.   In the ER, pt was afebrile, VSS.   Labs w/o leukocytosis, ESR 36, CRP 8.6.   XR of distal 1st phalanx w/ OM    #L foot 1st distal phalanx OM w/ purulent drainage    Overall,  82 y/o F w/ PMHx HTN, HLD, CKD, b/l CTS, GERD presenting w/ L foot 1st toe drainage and wound, now w/ 1st phalanx OM. Seen by podiatry, L 1st toe tail removed, wound to bone w/ purulence. Would cover broadly for now and narrow based on Cx data. F/u MRI.   Cx w/ pseudomonas, proteus, E avium, E faecalis, B fragilis.     Plan:   1. C/w Zosyn 3.375 g q8.   2. F/u podiatry recommendations, pt currently deciding about surgery. Explained to patient best curative option for OM is surgery, and possible antibiotic after pending OR Cx and data. Given growth of pseudomonas, it may be difficult for patient to tolerate cipro for 6 week course.     Thank you for this consult. Inpatient ID team will follow.    Ankur Centeno M.D.  Attending Physician  Division of Infectious Diseases  Department of Medicine    Please contact through MS Teams message.  Office: [phone] (after 5 PM or weekend)

## 2024-08-28 NOTE — PROGRESS NOTE ADULT - PROBLEM SELECTOR PLAN 1
L 1st toe infection presenting with pain, drainage, mild erythema. Xray: Focal soft tissue swelling along the distal first phalanx. Cortical erosion along the medial aspect of the first distal phalanx, concerning for osteomyelitis. ESR 36, CRP 8.6.    - Seen by podiatry: nail plate removed. Wound to bone with purulence identified. Planning for potential resection pending pt decision   - MRI: Osteomyelitis of the first digit distal phalanx.  - WC: Pseudomonas and Enterococcus spp  - S/p Vanco (8/22 - 8/25) per ID recs  - Continue Zosyn (8/22 - ) per ID recs  - ID following, appreciate recs  - checking A1c in AM L 1st toe infection presenting with pain, drainage, mild erythema. Xray: Focal soft tissue swelling along the distal first phalanx. Cortical erosion along the medial aspect of the first distal phalanx, concerning for osteomyelitis. ESR 36, CRP 8.6.    - Seen by podiatry: nail plate removed. Wound to bone with purulence identified. Planning for potential resection pending pt decision   - MRI: Osteomyelitis of the first digit distal phalanx.  - WC: Pseudomonas and Enterococcus spp  - S/p Vanco (8/22 - 8/25) per ID recs  - Continue Zosyn (8/22 - ) per ID recs  - ID following, appreciate recs  - A1c 5.9  - KIMBERLY showed calcified arteries bilaterally (indeterminate perfusion results) L 1st toe infection presenting with pain, drainage, mild erythema. Xray: Focal soft tissue swelling along the distal first phalanx. Cortical erosion along the medial aspect of the first distal phalanx, concerning for osteomyelitis. ESR 36, CRP 8.6.     - Discussed with daughter about surgery regarding risk/benefits, she will talk with other family  - Seen by podiatry: nail plate removed. Wound to bone with purulence identified. Planning for potential resection pending pt decision   - MRI: Osteomyelitis of the first digit distal phalanx.  - WC: Pseudomonas and Enterococcus spp  - S/p Vanco (8/22 - 8/25) per ID recs  - Continue Zosyn (8/22 - ) per ID recs  - ID following, appreciate recs  - A1c 5.9  - KIMBERLY showed calcified arteries bilaterally (indeterminate perfusion results)

## 2024-08-28 NOTE — PROGRESS NOTE ADULT - ASSESSMENT
83F presents for left hallux wound  -Pt was seen and evaluated  -Afebrile, no leukocytosis  -Left dorsal hallux wound to bone at the nail bed and within the first interspace,  erythema globally about the first digit, strong malodor, purulent drainage, no crepitus  -Left foot x-ray: Focal soft tissue swelling of the distal first phalanx with underlying cortical erosion of the tuft. Findings concerning for osteomyelitis.  -Left foot MRI: OM of first distal phalanx  -Left foot wound culture: Enterococcus faecalis/avium, Proteus mirabilis, Pseudomonas, few Bacteriodes   -ID recs, appreciated   -Extensive discussion with patient regarding the necessity for the amputation as the treatment of the infected bone, and antibiotics is the not optimal treatment with possible complication of sepsis loss of medina and life loss, pt showed verbal understanding and stated " She is not ready for the surgery" as she needs more time to decide   -Pod Plan: local wound care vs left foot partial 1st ray resection pending pt decision  -Discussed with attending

## 2024-08-28 NOTE — PROGRESS NOTE ADULT - SUBJECTIVE AND OBJECTIVE BOX
Patient is a 83y old  Female who presents with a chief complaint of L 1st toe infection (24 Aug 2024 09:25)      SUBJECTIVE / OVERNIGHT EVENTS:   - no Acute overnight events  - VS WNL  - S/p Vanc, continuing Zosyn  -       PHYSICAL EXAM:  GENERAL: NAD, lying in bed comfortably  HEAD:  Atraumatic, normocephalic  EYES: EOMI, , conjunctiva clear, no conjunctival pallor, anicteric sclera  HEART: Regular rate and rhythm, Normal S1 S2, no murmurs, rubs, or gallops  LUNGS: Unlabored respirations. Clear to ascultation bilaterally, no crackles, wheezing, or rhonchi  ABDOMEN: Soft, nondistended, nontender, no rebound or guarding, bowel sounds presents  EXTREMITIES: Warm extremities, no clubbing, cyanosis, or edema, peripheral pulses 2+ bilaterally. (+) L hallux dressing in place - no significant purulent drainage from nail be upon inspection.   MUSCULOSKELETAL: No joint swelling or tenderness to palpation  NEURO: moves all limbs spontaneously  SKIN: No rashes      VS  T(C): 36.3 (08-28-24 @ 05:24), Max: 36.4 (08-27-24 @ 22:47)  HR: 64 (08-28-24 @ 05:24) (64 - 79)  BP: 112/55 (08-28-24 @ 05:24) (104/56 - 134/68)  RR: 18 (08-28-24 @ 05:24) (16 - 18)  SpO2: 100% (08-28-24 @ 05:24) (97% - 100%)    LABS (available at time of writing 08-28-24 @ 07:41):                         10.4   5.82  )-----------( 232      ( 27 Aug 2024 06:40 )             32.4     08-27    140  |  106  |  27<H>  ----------------------------<  96  4.2   |  25  |  1.10    Ca    9.4      27 Aug 2024 06:40    TPro  6.8  /  Alb  3.5  /  TBili  <0.2  /  DBili  x   /  AST  25  /  ALT  15  /  AlkPhos  62  08-27    PT/INR - ( 27 Aug 2024 06:40 )   PT: 10.7 sec;   INR: 0.95 ratio         PTT - ( 27 Aug 2024 06:40 )  PTT:31.2 sec  Urinalysis Basic - ( 27 Aug 2024 06:40 )    Color: x / Appearance: x / SG: x / pH: x  Gluc: 96 mg/dL / Ketone: x  / Bili: x / Urobili: x   Blood: x / Protein: x / Nitrite: x   Leuk Esterase: x / RBC: x / WBC x   Sq Epi: x / Non Sq Epi: x / Bacteria: x          Medications:   acetaminophen     Tablet .. 650 milliGRAM(s) Oral every 6 hours PRN  amLODIPine   Tablet 7.5 milliGRAM(s) Oral daily  hydrochlorothiazide 25 milliGRAM(s) Oral daily  losartan 100 milliGRAM(s) Oral daily  melatonin 3 milliGRAM(s) Oral at bedtime PRN  metoprolol succinate ER 25 milliGRAM(s) Oral at bedtime  piperacillin/tazobactam IVPB.. 3.375 Gram(s) IV Intermittent every 8 hours      RADIOLOGY, EKG & ADDITIONAL TESTS: Reviewed.    Patient is a 83y old  Female who presents with a chief complaint of L 1st toe infection (24 Aug 2024 09:25)      SUBJECTIVE / OVERNIGHT EVENTS:   - no Acute overnight events  - VS WNL  - S/p Vanc, continuing Zosyn  - NGTD on BCx  - TTE normal      PHYSICAL EXAM:  GENERAL: NAD, lying in bed comfortably  HEAD:  Atraumatic, normocephalic  EYES: EOMI, , conjunctiva clear, no conjunctival pallor, anicteric sclera  HEART: Regular rate and rhythm, Normal S1 S2, no murmurs, rubs, or gallops  LUNGS: Unlabored respirations. Clear to ascultation bilaterally, no crackles, wheezing, or rhonchi  ABDOMEN: Soft, nondistended, nontender, no rebound or guarding, bowel sounds presents  EXTREMITIES: Warm extremities, no clubbing, cyanosis, or edema, peripheral pulses 2+ bilaterally. (+) L hallux dressing in place - no significant purulent drainage from nail be upon inspection.   MUSCULOSKELETAL: No joint swelling or tenderness to palpation  NEURO: moves all limbs spontaneously  SKIN: No rashes      VS  T(C): 36.3 (08-28-24 @ 05:24), Max: 36.4 (08-27-24 @ 22:47)  HR: 64 (08-28-24 @ 05:24) (64 - 79)  BP: 112/55 (08-28-24 @ 05:24) (104/56 - 134/68)  RR: 18 (08-28-24 @ 05:24) (16 - 18)  SpO2: 100% (08-28-24 @ 05:24) (97% - 100%)    LABS (available at time of writing 08-28-24 @ 07:41):                         10.4   5.82  )-----------( 232      ( 27 Aug 2024 06:40 )             32.4     08-27    140  |  106  |  27<H>  ----------------------------<  96  4.2   |  25  |  1.10    Ca    9.4      27 Aug 2024 06:40    TPro  6.8  /  Alb  3.5  /  TBili  <0.2  /  DBili  x   /  AST  25  /  ALT  15  /  AlkPhos  62  08-27    PT/INR - ( 27 Aug 2024 06:40 )   PT: 10.7 sec;   INR: 0.95 ratio         PTT - ( 27 Aug 2024 06:40 )  PTT:31.2 sec  Urinalysis Basic - ( 27 Aug 2024 06:40 )    Color: x / Appearance: x / SG: x / pH: x  Gluc: 96 mg/dL / Ketone: x  / Bili: x / Urobili: x   Blood: x / Protein: x / Nitrite: x   Leuk Esterase: x / RBC: x / WBC x   Sq Epi: x / Non Sq Epi: x / Bacteria: x          Medications:   acetaminophen     Tablet .. 650 milliGRAM(s) Oral every 6 hours PRN  amLODIPine   Tablet 7.5 milliGRAM(s) Oral daily  hydrochlorothiazide 25 milliGRAM(s) Oral daily  losartan 100 milliGRAM(s) Oral daily  melatonin 3 milliGRAM(s) Oral at bedtime PRN  metoprolol succinate ER 25 milliGRAM(s) Oral at bedtime  piperacillin/tazobactam IVPB.. 3.375 Gram(s) IV Intermittent every 8 hours      RADIOLOGY, EKG & ADDITIONAL TESTS: Reviewed.

## 2024-08-29 PROCEDURE — 99232 SBSQ HOSP IP/OBS MODERATE 35: CPT

## 2024-08-29 RX ADMIN — METOPROLOL TARTRATE 25 MILLIGRAM(S): 100 TABLET ORAL at 23:18

## 2024-08-29 RX ADMIN — LOSARTAN POTASSIUM 100 MILLIGRAM(S): 50 TABLET ORAL at 05:25

## 2024-08-29 RX ADMIN — PIPERACILLIN SODIUM AND TAZOBACTAM SODIUM 25 GRAM(S): 3; .375 INJECTION, POWDER, FOR SOLUTION INTRAVENOUS at 04:28

## 2024-08-29 RX ADMIN — PIPERACILLIN SODIUM AND TAZOBACTAM SODIUM 25 GRAM(S): 3; .375 INJECTION, POWDER, FOR SOLUTION INTRAVENOUS at 12:25

## 2024-08-29 RX ADMIN — PIPERACILLIN SODIUM AND TAZOBACTAM SODIUM 25 GRAM(S): 3; .375 INJECTION, POWDER, FOR SOLUTION INTRAVENOUS at 21:36

## 2024-08-29 RX ADMIN — AMLODIPINE BESYLATE 7.5 MILLIGRAM(S): 10 TABLET ORAL at 05:25

## 2024-08-29 NOTE — PROGRESS NOTE ADULT - SUBJECTIVE AND OBJECTIVE BOX
Patient is a 83y old  Female who presents with a chief complaint of Osteomyelitis     (29 Aug 2024 10:18)       INTERVAL HPI/OVERNIGHT EVENTS:  Patient seen and evaluated at bedside.  Pt is resting comfortable in NAD. Denies N/V/F/C.      Allergies    iodinated radiocontrast agents (Chills)  Prevacid (Chills)    Intolerances        Vital Signs Last 24 Hrs  T(C): 36.6 (29 Aug 2024 12:25), Max: 36.8 (29 Aug 2024 05:12)  T(F): 97.9 (29 Aug 2024 12:25), Max: 98.3 (29 Aug 2024 05:12)  HR: 69 (29 Aug 2024 12:25) (66 - 69)  BP: 114/70 (29 Aug 2024 12:25) (110/63 - 150/77)  BP(mean): --  RR: 17 (29 Aug 2024 12:25) (17 - 18)  SpO2: 100% (29 Aug 2024 12:25) (100% - 100%)    Parameters below as of 29 Aug 2024 12:25  Patient On (Oxygen Delivery Method): room air        LABS:              CAPILLARY BLOOD GLUCOSE          Lower Extremity Physical Exam:  Vascular: DP/PT 2/4, B/L, CFT < 3 seconds B/L, Temperature gradient warm to cool, B/L.   Neuro: Epicritic sensation intact to the level of the digits, B/L.  Musculoskeletal/Ortho: unremarkable  Skin: left dorsal hallux wound to bone at the nail bed and within the first interspace,  erythema globally about the first digit, strong malodor, purulent drainage, no crepitus      RADIOLOGY & ADDITIONAL TESTS:

## 2024-08-29 NOTE — PROGRESS NOTE ADULT - SUBJECTIVE AND OBJECTIVE BOX
Patient is a 83y old  Female who presents with a chief complaint of L 1st toe infection (24 Aug 2024 09:25)      SUBJECTIVE / OVERNIGHT EVENTS:   - no Acute overnight events  - VS WNL  - S/p Vanc, continuing Zosyn  - NGTD on BCx  - Waiting to hear from daughter and patient regarding surgery      PHYSICAL EXAM:  GENERAL: NAD, lying in bed comfortably  HEAD:  Atraumatic, normocephalic  EYES: EOMI, , conjunctiva clear, no conjunctival pallor, anicteric sclera  HEART: Regular rate and rhythm, Normal S1 S2, no murmurs, rubs, or gallops  LUNGS: Unlabored respirations. Clear to ascultation bilaterally, no crackles, wheezing, or rhonchi  ABDOMEN: Soft, nondistended, nontender, no rebound or guarding, bowel sounds presents  EXTREMITIES: Warm extremities, no clubbing, cyanosis, or edema, peripheral pulses 2+ bilaterally. (+) L hallux dressing in place - no significant purulent drainage from nail be upon inspection.   MUSCULOSKELETAL: No joint swelling or tenderness to palpation  NEURO: moves all limbs spontaneously  SKIN: No rashes      VS  T(C): 36.8 (08-29-24 @ 05:12), Max: 36.8 (08-29-24 @ 05:12)  HR: 66 (08-29-24 @ 05:12) (66 - 81)  BP: 150/77 (08-29-24 @ 05:12) (110/63 - 150/77)  RR: 18 (08-29-24 @ 05:12) (17 - 18)  SpO2: 100% (08-29-24 @ 05:12) (100% - 100%)    LABS (available at time of writing 08-29-24 @ 07:51):                     Medications:   acetaminophen     Tablet .. 650 milliGRAM(s) Oral every 6 hours PRN  amLODIPine   Tablet 7.5 milliGRAM(s) Oral daily  hydrochlorothiazide 25 milliGRAM(s) Oral daily  losartan 100 milliGRAM(s) Oral daily  melatonin 3 milliGRAM(s) Oral at bedtime PRN  metoprolol succinate ER 25 milliGRAM(s) Oral at bedtime  piperacillin/tazobactam IVPB.. 3.375 Gram(s) IV Intermittent every 8 hours      RADIOLOGY, EKG & ADDITIONAL TESTS: Reviewed.   < from: MR Foot w/wo IV Cont, Left (08.23.24 @ 14:24) >  MR FOOT WAW IC LT   ORDERED BY: NEELIMA JEFFERSON     PROCEDURE DATE:  08/23/2024          INTERPRETATION:  EXAMINATION: MR FOOT WITHOUT AND WITH IV CONTRAST LEFT    CLINICAL INDICATION:Evaluate for left hallux osteomyelitis.    COMPARISON: Foot radiographs dated 8/22/24    TECHNIQUE: Multiplanar, multi-sequence MRI of the left foot was performed   without and with intravenous contrast.    INTERPRETATION:  There is T1 hypointense/STIR hyperintense signal abnormality with   enhancement involving the distal phalanx of the first digit with   overlying ulcer, findings consistent osteomyelitis.There is moderate   midfoot arthropathy with scattered subchondral edema present at the   tarsometatarsal joints and talonavicular joint.There is no localized   abscess. There is no acute fracture or AVN. There is edema within the   intrinsic muscles of the foot. There is dorsal foot soft tissue edema.    IMPRESSION:  Osteomyelitis of the first digit distal phalanx.    < end of copied text >   Patient is a 83y old  Female who presents with a chief complaint of L 1st toe infection (24 Aug 2024 09:25)      SUBJECTIVE / OVERNIGHT EVENTS:   - no Acute overnight events  - VS WNL  - S/p Vanc, continuing Zosyn  - NGTD on BCx  - After speaking with daughter opting to pursue medical management only    PHYSICAL EXAM:  GENERAL: NAD, lying in bed comfortably  HEAD:  Atraumatic, normocephalic  EYES: EOMI, , conjunctiva clear, no conjunctival pallor, anicteric sclera  HEART: Regular rate and rhythm, Normal S1 S2, no murmurs, rubs, or gallops  LUNGS: Unlabored respirations. Clear to ascultation bilaterally, no crackles, wheezing, or rhonchi  ABDOMEN: Soft, nondistended, nontender, no rebound or guarding, bowel sounds presents  EXTREMITIES: Warm extremities, no clubbing, cyanosis, or edema, peripheral pulses 2+ bilaterally. (+) L hallux dressing in place - no significant purulent drainage from nail be upon inspection.   MUSCULOSKELETAL: No joint swelling or tenderness to palpation  NEURO: moves all limbs spontaneously  SKIN: No rashes      VS  T(C): 36.8 (08-29-24 @ 05:12), Max: 36.8 (08-29-24 @ 05:12)  HR: 66 (08-29-24 @ 05:12) (66 - 81)  BP: 150/77 (08-29-24 @ 05:12) (110/63 - 150/77)  RR: 18 (08-29-24 @ 05:12) (17 - 18)  SpO2: 100% (08-29-24 @ 05:12) (100% - 100%)    LABS (available at time of writing 08-29-24 @ 09:34):                     Medications:   acetaminophen     Tablet .. 650 milliGRAM(s) Oral every 6 hours PRN  amLODIPine   Tablet 7.5 milliGRAM(s) Oral daily  hydrochlorothiazide 25 milliGRAM(s) Oral daily  losartan 100 milliGRAM(s) Oral daily  melatonin 3 milliGRAM(s) Oral at bedtime PRN  metoprolol succinate ER 25 milliGRAM(s) Oral at bedtime  piperacillin/tazobactam IVPB.. 3.375 Gram(s) IV Intermittent every 8 hours      RADIOLOGY, EKG & ADDITIONAL TESTS: Reviewed.    Patient is a 83y old  Female who presents with a chief complaint of L 1st toe infection (24 Aug 2024 09:25)      SUBJECTIVE / OVERNIGHT EVENTS:   - no Acute overnight events  - VS WNL  - S/p Vanc, continuing Zosyn  - NGTD on BCx  - After speaking with daughter initially opting to pursue medical management only but after speaking with ID attending is unsure. Options for surgery vs. IV Zosyn w/PICC vs. PO Cipro + Augmentin were discussed. Patient needs to discuss with family.       PHYSICAL EXAM:  GENERAL: NAD, lying in bed comfortably  HEAD:  Atraumatic, normocephalic  EYES: EOMI, , conjunctiva clear, no conjunctival pallor, anicteric sclera  HEART: Regular rate and rhythm, Normal S1 S2, no murmurs, rubs, or gallops  LUNGS: Unlabored respirations. Clear to ascultation bilaterally, no crackles, wheezing, or rhonchi  ABDOMEN: Soft, nondistended, nontender, no rebound or guarding, bowel sounds presents  EXTREMITIES: Warm extremities, no clubbing, cyanosis, or edema, peripheral pulses 2+ bilaterally. (+) L hallux dressing in place - no significant purulent drainage from nail be upon inspection.   MUSCULOSKELETAL: No joint swelling or tenderness to palpation  NEURO: moves all limbs spontaneously  SKIN: No rashes      VS  T(C): 36.8 (08-29-24 @ 05:12), Max: 36.8 (08-29-24 @ 05:12)  HR: 66 (08-29-24 @ 05:12) (66 - 81)  BP: 150/77 (08-29-24 @ 05:12) (110/63 - 150/77)  RR: 18 (08-29-24 @ 05:12) (17 - 18)  SpO2: 100% (08-29-24 @ 05:12) (100% - 100%)    LABS (available at time of writing 08-29-24 @ 09:34):                     Medications:   acetaminophen     Tablet .. 650 milliGRAM(s) Oral every 6 hours PRN  amLODIPine   Tablet 7.5 milliGRAM(s) Oral daily  hydrochlorothiazide 25 milliGRAM(s) Oral daily  losartan 100 milliGRAM(s) Oral daily  melatonin 3 milliGRAM(s) Oral at bedtime PRN  metoprolol succinate ER 25 milliGRAM(s) Oral at bedtime  piperacillin/tazobactam IVPB.. 3.375 Gram(s) IV Intermittent every 8 hours      RADIOLOGY, EKG & ADDITIONAL TESTS: Reviewed.

## 2024-08-29 NOTE — PROGRESS NOTE ADULT - PROBLEM SELECTOR PLAN 1
L 1st toe infection presenting with pain, drainage, mild erythema. Xray: Focal soft tissue swelling along the distal first phalanx. Cortical erosion along the medial aspect of the first distal phalanx, concerning for osteomyelitis. ESR 36, CRP 8.6.     - Discussed with daughter about surgery regarding risk/benefits, she will talk with other family  - Seen by podiatry: nail plate removed. Wound to bone with purulence identified. Planning for potential resection pending pt decision   - MRI: Osteomyelitis of the first digit distal phalanx.  - WC: Pseudomonas and Enterococcus spp  - S/p Vanco (8/22 - 8/25) per ID recs  - Continue Zosyn (8/22 - ) per ID recs  - Without surgery will need ciprofloxacin x6 weeks outpatient per ID  - ID following, appreciate recs  - A1c 5.9  - KIMBERLY showed calcified arteries bilaterally (indeterminate perfusion results) L 1st toe infection presenting with pain, drainage, mild erythema. Xray: Focal soft tissue swelling along the distal first phalanx. Cortical erosion along the medial aspect of the first distal phalanx, concerning for osteomyelitis. ESR 36, CRP 8.6.     - Discussed with daughter about surgery regarding risk/benefits (family decided medical management only)  - Seen by podiatry: nail plate removed. Wound to bone with purulence identified. Resection offered to patient - declined   - MRI: Osteomyelitis of the first digit distal phalanx.  - WC: Pseudomonas and Enterococcus spp  - S/p Vanco (8/22 - 8/25) per ID recs  - Continue Zosyn (8/22 - ) per ID recs  - Without surgery will need ciprofloxacin x6 weeks outpatient per ID  - ID following, appreciate recs  - A1c 5.9  - KIMBERLY showed calcified arteries bilaterally (indeterminate perfusion results) L 1st toe infection presenting with pain, drainage, mild erythema. Xray: Focal soft tissue swelling along the distal first phalanx. Cortical erosion along the medial aspect of the first distal phalanx, concerning for osteomyelitis. ESR 36, CRP 8.6. Many Discussions between patient, daughter, and ID regarding surgical vs. PO vs. IV options.    - Awaiting family decision options presented:      - Surgery + shorter course Abx      - IV Zosyn w/ PICC  x6 weeks      - PO Cipro + Augmentin x6 weeks    - Seen by podiatry: nail plate removed. Wound to bone with purulence identified. Resection offered to patient - declined   - MRI: Osteomyelitis of the first digit distal phalanx.  - WC: Pseudomonas and Enterococcus spp  - S/p Vanco (8/22 - 8/25) per ID recs  - Continue Zosyn (8/22 - ) per ID recs  - ID following, appreciate recs  - A1c 5.9  - KIMBERLY showed calcified arteries bilaterally (indeterminate perfusion results)

## 2024-08-29 NOTE — PROGRESS NOTE ADULT - ASSESSMENT
This is an 82 y/o F w/ PMHx HTN, HLD, CKD, b/l CTS, GERD who presents to [facility] on 8/22 for L 1st toe pain w/ drainage for 3 weeks, following w/ outpatient podiatrist, now w/ malodor, pus from ulceration.   Pt recent seen at [facility], discharged w/ Clindamycin, now sent to [facility] to r/o OM by her podiatrist.   In the ER, pt was afebrile, VSS.   Labs w/o leukocytosis, ESR 36, CRP 8.6.   XR of distal 1st phalanx w/ OM    #L foot 1st distal phalanx OM w/ purulent drainage    Overall,  82 y/o F w/ PMHx HTN, HLD, CKD, b/l CTS, GERD presenting w/ L foot 1st toe drainage and wound, now w/ 1st phalanx OM. Seen by podiatry, L 1st toe tail removed, wound to bone w/ purulence. Would cover broadly for now and narrow based on Cx data. F/u MRI.   Cx w/ pseudomonas, proteus, E avium, E faecalis, B fragilis.     Plan:   1. C/w Zosyn 3.375 g q8.   2. Pt at this time deferring surgery, Explained to patient best curative option for OM is surgery, and possible antibiotic after pending OR Cx and data.   Options for treatment include Zosyn 4.5 g q8 via PICC or Cipro 500 mg BID and Augmentin 875/125 mg BID.   Discussed in detail risk of PICC line complications (bleeding, clot, infection) and need for 3 time a day dosing which can be cumbersome.  Side effects of long course of Cipro include GI, diarrhea, C diff, tendonitis/rupture, Augmentin GI/diarrhea.      Pt would prefer oral abx, however will need time to discuss with her daughter.      Thank you for this consult. Inpatient ID team will follow.    Ankur Centeno M.D.  Attending Physician  Division of Infectious Diseases  Department of Medicine    Please contact through MS Teams message.  Office: [phone] (after 5 PM or weekend)

## 2024-08-29 NOTE — DIETITIAN INITIAL EVALUATION ADULT - PROBLEM SELECTOR PLAN 1
L 1st toe infection - pain, drainage, mild erythema. Xray: Focal soft tissue swelling along the distal first phalanx.  Cortical erosion along the medial aspect of the first distal phalanx, concerning for osteomyelitis.  ESR 36, CRP 8.6  Seen by podiatry - nail plate removed. Wound to bone with purulence identified  -vanc, zosyn  -MRI L foot with contrast. Patient notes hx of chills with dye contrast in the past but does not believe it was for an MRI. Never had an anaphylactic reaction. No SOB, hypotension, rash.  -ID c/s in AM  -has 2+ DP pulses bilaterally. Warm and well perfused. Low suspicion for PAD based off exam but will obtain KIMBERLY/PVR and if abnormal consult vascular surgery  -f/u BCx x2, Wound culture. Not septic

## 2024-08-29 NOTE — PROGRESS NOTE ADULT - ASSESSMENT
83F presents for left hallux wound  -Pt was seen and evaluated  -Afebrile, no leukocytosis  -Left dorsal hallux wound to bone at the nail bed and within the first interspace,  erythema globally about the first digit, strong malodor, purulent drainage, no crepitus  -Left foot x-ray: Focal soft tissue swelling of the distal first phalanx with underlying cortical erosion of the tuft. Findings concerning for osteomyelitis.  -Left foot MRI: OM of first distal phalanx  -Left foot wound culture: Enterococcus faecalis/avium, Proteus mirabilis, Pseudomonas, few Bacteriodes   -ID recs, appreciated   -Extensive discussion with patient regarding the necessity for the amputation as the treatment of the infected bone, and antibiotics is the not optimal treatment with possible complication of sepsis loss of medina and life loss, pt showed verbal understanding and stated "She would not like any surgical intervention at this time and would like to continue with IV/PO antibiotics  -Pod Plan: local wound care vs left foot partial 1st ray resection pending pt decision  -Seen with attending

## 2024-08-29 NOTE — DIETITIAN INITIAL EVALUATION ADULT - CALCULATED TO (CAL/KG)
Caller: Judah Fan    Relationship: Self    Best call back number: [phone]    Medication needed:   Requested Prescriptions     Pending Prescriptions Disp Refills   • valACYclovir (Valtrex) 1000 MG tablet 14 tablet 2     Sig: Take 1 tablet by mouth 2 (Two) Times a Day.       When do you need the refill by: ASAP    What additional details did the patient provide when requesting the medication: MEDICATION REFILL  Does the patient have less than a 3 day supply:  [x] Yes  [] No    What is the patient's preferred pharmacy: [id number] - [place], [place] - [address] AT ACROSS FROM [address]  [phone] [facility] [phone]              
[id number]

## 2024-08-29 NOTE — PROGRESS NOTE ADULT - SUBJECTIVE AND OBJECTIVE BOX
Infectious Diseases Follow Up:    Patient is a 83y old  Female who presents with a chief complaint of L 1st toe infection (29 Aug 2024 07:50)      Interval History/ROS:    Allergies  iodinated radiocontrast agents (Chills)  Prevacid (Chills)        ANTIMICROBIALS:  piperacillin/tazobactam IVPB.. 3.375 every 8 hours      Current Abx:     Previous Abx     OTHER MEDS:  MEDICATIONS  (STANDING):  acetaminophen     Tablet .. 650 every 6 hours PRN  amLODIPine   Tablet 7.5 daily  hydrochlorothiazide 25 daily  losartan 100 daily  melatonin 3 at bedtime PRN  metoprolol succinate ER 25 at bedtime      Vital Signs Last 24 Hrs  T(C): 36.8 (29 Aug 2024 05:12), Max: 36.8 (29 Aug 2024 05:12)  T(F): 98.3 (29 Aug 2024 05:12), Max: 98.3 (29 Aug 2024 05:12)  HR: 66 (29 Aug 2024 05:12) (66 - 81)  BP: 150/77 (29 Aug 2024 05:12) (110/63 - 150/77)  BP(mean): --  RR: 18 (29 Aug 2024 05:12) (17 - 18)  SpO2: 100% (29 Aug 2024 05:12) (100% - 100%)    Parameters below as of 29 Aug 2024 05:12  Patient On (Oxygen Delivery Method): room air        PHYSICAL EXAM:  GENERAL: NAD, well-developed  HEAD:  Atraumatic, Normocephalic  EYES: EOMI, PERRLA, conjunctiva and sclera clear  NECK: Supple, No JVD  CHEST/LUNG: Clear to auscultation bilaterally; No wheeze  HEART: Regular rate and rhythm; No murmurs, rubs, or gallops  ABDOMEN: Soft, Nontender, Nondistended; Bowel sounds present  EXTREMITIES:  2+ Peripheral Pulses, No clubbing, cyanosis, or edema  PSYCH: AAOx3  NEUROLOGY: non-focal  SKIN: No rashes or lesions                    MICROBIOLOGY:  v  .Blood Blood  08-22-24   No growth at 5 days  --  --      .Blood Blood-Peripheral  08-22-24   No growth at 5 days  --  --      .Abscess left hallux  08-22-24   Few Pseudomonas aeruginosa  Moderate Enterococcus avium  Few Enterococcus faecalis  Moderate Bacteroides fragilis "Susceptibilities not performed"  Rare Proteus mirabilis  --  Pseudomonas aeruginosa  Enterococcus avium  Enterococcus faecalis  Proteus mirabilis                RADIOLOGY:       Infectious Diseases Follow Up:    Patient is a 83y old  Female who presents with a chief complaint of L 1st toe infection (29 Aug 2024 07:50)      Interval History/ROS:  No acute events, pt is feeling okay    Allergies  iodinated radiocontrast agents (Chills)  Prevacid (Chills)        ANTIMICROBIALS:  piperacillin/tazobactam IVPB.. 3.375 every 8 hours      Current Abx:     Previous Abx     OTHER MEDS:  MEDICATIONS  (STANDING):  acetaminophen     Tablet .. 650 every 6 hours PRN  amLODIPine   Tablet 7.5 daily  hydrochlorothiazide 25 daily  losartan 100 daily  melatonin 3 at bedtime PRN  metoprolol succinate ER 25 at bedtime      Vital Signs Last 24 Hrs  T(C): 36.8 (29 Aug 2024 05:12), Max: 36.8 (29 Aug 2024 05:12)  T(F): 98.3 (29 Aug 2024 05:12), Max: 98.3 (29 Aug 2024 05:12)  HR: 66 (29 Aug 2024 05:12) (66 - 81)  BP: 150/77 (29 Aug 2024 05:12) (110/63 - 150/77)  BP(mean): --  RR: 18 (29 Aug 2024 05:12) (17 - 18)  SpO2: 100% (29 Aug 2024 05:12) (100% - 100%)    Parameters below as of 29 Aug 2024 05:12  Patient On (Oxygen Delivery Method): room air        PHYSICAL EXAM:  GENERAL: NAD, well-developed  HEAD:  Atraumatic, Normocephalic  EYES: EOMI, conjunctiva and sclera clear  CHEST/LUNG: On RA, not in respiratory distress  EXTREMITIES:  L foot nail bed removed, wrapped, dressing c/d/i  PSYCH: AAOx3                     MICROBIOLOGY:  v  .Blood Blood  08-22-24   No growth at 5 days  --  --      .Blood Blood-Peripheral  08-22-24   No growth at 5 days  --  --      .Abscess left hallux  08-22-24   Few Pseudomonas aeruginosa  Moderate Enterococcus avium  Few Enterococcus faecalis  Moderate Bacteroides fragilis "Susceptibilities not performed"  Rare Proteus mirabilis  --  Pseudomonas aeruginosa  Enterococcus avium  Enterococcus faecalis  Proteus mirabilis                RADIOLOGY:

## 2024-08-29 NOTE — DIETITIAN INITIAL EVALUATION ADULT - PERTINENT MEDS FT
MEDICATIONS  (STANDING):  amLODIPine   Tablet 7.5 milliGRAM(s) Oral daily  hydrochlorothiazide 25 milliGRAM(s) Oral daily  losartan 100 milliGRAM(s) Oral daily  metoprolol succinate ER 25 milliGRAM(s) Oral at bedtime  piperacillin/tazobactam IVPB.. 3.375 Gram(s) IV Intermittent every 8 hours    MEDICATIONS  (PRN):  acetaminophen     Tablet .. 650 milliGRAM(s) Oral every 6 hours PRN Temp greater or equal to 38C (100.4F), Mild Pain (1 - 3), Moderate Pain (4 - 6)  melatonin 3 milliGRAM(s) Oral at bedtime PRN Insomnia

## 2024-08-29 NOTE — DIETITIAN INITIAL EVALUATION ADULT - OTHER INFO
83 year old female with a PMH of HTN, HLD, CKD and GERD presenting with L 1st toe pain, drainage x 3 weeks. Admitted for L 1st toe osteomyelitis and undergoing consideration for partial amputation of L hallux per chart.    Patient reports good appetite. No GI distress reported. Has no food allergies. UBW is 138-140 lbs. and clarified height is 64 in. ABW is 140 lbs. (8/22) per chart indicating stable weight. Noted w/ +1 L/R foot edema and no pressure injuries per RN flow sheet.    Patient declined nutrition education at this time.

## 2024-08-30 PROCEDURE — 99232 SBSQ HOSP IP/OBS MODERATE 35: CPT

## 2024-08-30 RX ADMIN — AMLODIPINE BESYLATE 7.5 MILLIGRAM(S): 10 TABLET ORAL at 06:05

## 2024-08-30 RX ADMIN — PIPERACILLIN SODIUM AND TAZOBACTAM SODIUM 25 GRAM(S): 3; .375 INJECTION, POWDER, FOR SOLUTION INTRAVENOUS at 13:09

## 2024-08-30 RX ADMIN — LOSARTAN POTASSIUM 100 MILLIGRAM(S): 50 TABLET ORAL at 06:05

## 2024-08-30 RX ADMIN — PIPERACILLIN SODIUM AND TAZOBACTAM SODIUM 25 GRAM(S): 3; .375 INJECTION, POWDER, FOR SOLUTION INTRAVENOUS at 20:28

## 2024-08-30 RX ADMIN — PIPERACILLIN SODIUM AND TAZOBACTAM SODIUM 25 GRAM(S): 3; .375 INJECTION, POWDER, FOR SOLUTION INTRAVENOUS at 06:05

## 2024-08-30 NOTE — PROGRESS NOTE ADULT - ASSESSMENT
This is an 84 y/o F w/ PMHx HTN, HLD, CKD, b/l CTS, GERD who presents to [facility] on 8/22 for L 1st toe pain w/ drainage for 3 weeks, following w/ outpatient podiatrist, now w/ malodor, pus from ulceration.   Pt recent seen at [facility], discharged w/ Clindamycin, now sent to [facility] to r/o OM by her podiatrist.   In the ER, pt was afebrile, VSS.   Labs w/o leukocytosis, ESR 36, CRP 8.6.   XR of distal 1st phalanx w/ OM    #L foot 1st distal phalanx OM w/ purulent drainage    Overall,  84 y/o F w/ PMHx HTN, HLD, CKD, b/l CTS, GERD presenting w/ L foot 1st toe drainage and wound, now w/ 1st phalanx OM. Seen by podiatry, L 1st toe tail removed, wound to bone w/ purulence. Would cover broadly for now and narrow based on Cx data. F/u MRI.   Cx w/ pseudomonas, proteus, E avium, E faecalis, B fragilis.     Plan:   1. C/w Zosyn 3.375 g q8.   2. Pt at this time deferring surgery, Explained to patient best curative option for OM is surgery, and possible antibiotic after pending OR Cx and data.   Options for treatment include Zosyn 4.5 g q8 via PICC or Cipro 500 mg BID and Augmentin 875/125 mg BID until 9/2/24  Discussed in detail risk of PICC line complications (bleeding, clot, infection) and need for 3 time a day dosing which can be cumbersome.  Side effects of long course of Cipro include GI, diarrhea, C diff, tendonitis/rupture, Augmentin GI/diarrhea.      Pt would prefer oral abx, however will need time to discuss with her daughter.      Thank you for this consult. Inpatient ID team will follow peripherally     Ankur Centeno M.D.  Attending Physician  Division of Infectious Diseases  Department of Medicine    Please contact through MS Teams message.  Office: [phone] (after 5 PM or weekend)       This is an 84 y/o F w/ PMHx HTN, HLD, CKD, b/l CTS, GERD who presents to [facility] on 8/22 for L 1st toe pain w/ drainage for 3 weeks, following w/ outpatient podiatrist, now w/ malodor, pus from ulceration.   Pt recent seen at [facility], discharged w/ Clindamycin, now sent to [facility] to r/o OM by her podiatrist.   In the ER, pt was afebrile, VSS.   Labs w/o leukocytosis, ESR 36, CRP 8.6.   XR of distal 1st phalanx w/ OM    #L foot 1st distal phalanx OM w/ purulent drainage    Overall,  84 y/o F w/ PMHx HTN, HLD, CKD, b/l CTS, GERD presenting w/ L foot 1st toe drainage and wound, now w/ 1st phalanx OM. Seen by podiatry, L 1st toe tail removed, wound to bone w/ purulence. Would cover broadly for now and narrow based on Cx data. F/u MRI.   Cx w/ pseudomonas, proteus, E avium, E faecalis, B fragilis.     Plan:   1. C/w Zosyn 3.375 g q8.   2. Pt at this time deferring surgery, Explained to patient best curative option for OM is surgery, and possible antibiotic after pending OR Cx and data.   Options for treatment include Zosyn 4.5 g q8 via PICC or Cipro 500 mg BID and Augmentin 875/125 mg BID until 10/2/24  Discussed in detail risk of PICC line complications (bleeding, clot, infection) and need for 3 time a day dosing which can be cumbersome.  Side effects of long course of Cipro include GI, diarrhea, C diff, tendonitis/rupture, Augmentin GI/diarrhea.      Pt would prefer oral abx, however will need time to discuss with her daughter.      Thank you for this consult. Inpatient ID team will follow peripherally     Ankur Centeno M.D.  Attending Physician  Division of Infectious Diseases  Department of Medicine    Please contact through MS Teams message.  Office: [phone] (after 5 PM or weekend)

## 2024-08-30 NOTE — PROGRESS NOTE ADULT - ASSESSMENT
83F with PMHx HTN, HLD, CKD, bilateral carpal tunnel syndrome, GERD presenting with L 1st toe pain, drainage x3 weeks. Admitted for L 1st toe osteomyelitis and undergoing consideration for partial amputation of L hallux.  83F with PMHx HTN, HLD, CKD, bilateral carpal tunnel syndrome, GERD presenting with L 1st toe pain, drainage x3 weeks. Admitted for L 1st toe osteomyelitis and undergoing consideration for partial amputation of L hallux. Pending decision from daughter and family regarding surgery vs. IV Zosyn only w/PICC vs. PO Cipro + Augmentin.

## 2024-08-30 NOTE — PROGRESS NOTE ADULT - PROBLEM SELECTOR PLAN 1
L 1st toe infection presenting with pain, drainage, mild erythema. Xray: Focal soft tissue swelling along the distal first phalanx. Cortical erosion along the medial aspect of the first distal phalanx, concerning for osteomyelitis. ESR 36, CRP 8.6. Many Discussions between patient, daughter, and ID regarding surgical vs. PO vs. IV options.    - Awaiting family decision options presented:      - Surgery + shorter course Abx      - IV Zosyn w/ PICC  x6 weeks      - PO Cipro + Augmentin x6 weeks    - Seen by podiatry: nail plate removed. Wound to bone with purulence identified. Resection offered to patient - declined   - MRI: Osteomyelitis of the first digit distal phalanx.  - WC: Pseudomonas and Enterococcus spp  - S/p Vanco (8/22 - 8/25) per ID recs  - Continue Zosyn (8/22 - ) per ID recs  - ID following, appreciate recs  - A1c 5.9  - KIMBERLY showed calcified arteries bilaterally (indeterminate perfusion results) L 1st toe infection presenting with pain, drainage, mild erythema. Xray: Focal soft tissue swelling along the distal first phalanx. Cortical erosion along the medial aspect of the first distal phalanx, concerning for osteomyelitis. ESR 36, CRP 8.6. Many Discussions between patient, daughter, and ID regarding surgical vs. PO vs. IV options.    - Awaiting family decision. Options presented:      - Surgery + shorter course Abx      - IV Zosyn w/ PICC  x6 weeks      - PO Cipro + Augmentin x6 weeks    - Seen by podiatry: nail plate removed. Wound to bone with purulence identified. Resection offered to patient - declined   - MRI: Osteomyelitis of the first digit distal phalanx.  - WC: Pseudomonas and Enterococcus spp  - S/p Vanco (8/22 - 8/25) per ID recs  - Continue Zosyn (8/22 - ) per ID recs  - ID following, appreciate recs  - A1c 5.9  - KIMBERLY showed calcified arteries bilaterally (indeterminate perfusion results)

## 2024-08-30 NOTE — PROGRESS NOTE ADULT - SUBJECTIVE AND OBJECTIVE BOX
Patient is a 83y old  Female who presents with a chief complaint of L 1st toe infection (24 Aug 2024 09:25)      SUBJECTIVE / OVERNIGHT EVENTS:   - no Acute overnight events  - VS WNL  - S/p Vanc, continuing Zosyn  - NGTD on BCx  - After speaking with daughter initially opting to pursue medical management only but after speaking with ID attending is unsure. Options for surgery vs. IV Zosyn w/PICC vs. PO Cipro + Augmentin were discussed. Patient needs to discuss with family.       PHYSICAL EXAM:  GENERAL: NAD, lying in bed comfortably  HEAD:  Atraumatic, normocephalic  EYES: EOMI, , conjunctiva clear, no conjunctival pallor, anicteric sclera  HEART: Regular rate and rhythm, Normal S1 S2, no murmurs, rubs, or gallops  LUNGS: Unlabored respirations. Clear to ascultation bilaterally, no crackles, wheezing, or rhonchi  ABDOMEN: Soft, nondistended, nontender, no rebound or guarding, bowel sounds presents  EXTREMITIES: Warm extremities, no clubbing, cyanosis, or edema, peripheral pulses 2+ bilaterally. (+) L hallux dressing in place - no significant purulent drainage from nail be upon inspection.   MUSCULOSKELETAL: No joint swelling or tenderness to palpation  NEURO: moves all limbs spontaneously  SKIN: No rashes      VS  T(C): 36.7 (08-30-24 @ 05:14), Max: 36.7 (08-29-24 @ 22:13)  HR: 60 (08-30-24 @ 05:14) (60 - 69)  BP: 122/60 (08-30-24 @ 05:14) (114/70 - 122/60)  RR: 17 (08-30-24 @ 05:14) (17 - 17)  SpO2: 100% (08-30-24 @ 05:14) (100% - 100%)    LABS (available at time of writing 08-30-24 @ 07:20):                     Medications:   acetaminophen     Tablet .. 650 milliGRAM(s) Oral every 6 hours PRN  amLODIPine   Tablet 7.5 milliGRAM(s) Oral daily  hydrochlorothiazide 25 milliGRAM(s) Oral daily  losartan 100 milliGRAM(s) Oral daily  melatonin 3 milliGRAM(s) Oral at bedtime PRN  metoprolol succinate ER 25 milliGRAM(s) Oral at bedtime  piperacillin/tazobactam IVPB.. 3.375 Gram(s) IV Intermittent every 8 hours      RADIOLOGY, EKG & ADDITIONAL TESTS: Reviewed.    Patient is a 83y old  Female who presents with a chief complaint of L 1st toe infection (24 Aug 2024 09:25)      SUBJECTIVE / OVERNIGHT EVENTS:   - no Acute overnight events  - VS WNL  - S/p Vanc, continuing Zosyn  - NGTD on BCx  - Spoke with daughter again, needs family discussion. Options for surgery vs. IV Zosyn w/PICC vs. PO Cipro + Augmentin were discussed. Patient needs to discuss with family.       PHYSICAL EXAM:  GENERAL: NAD, lying in bed comfortably  HEAD:  Atraumatic, normocephalic  EYES: EOMI, , conjunctiva clear, no conjunctival pallor, anicteric sclera  HEART: Regular rate and rhythm, Normal S1 S2, no murmurs, rubs, or gallops  LUNGS: Unlabored respirations. Clear to ascultation bilaterally, no crackles, wheezing, or rhonchi  ABDOMEN: Soft, nondistended, nontender, no rebound or guarding, bowel sounds presents  EXTREMITIES: Warm extremities, no clubbing, cyanosis, or edema, peripheral pulses 2+ bilaterally. (+) L hallux dressing in place - no significant purulent drainage from nail be upon inspection.   MUSCULOSKELETAL: No joint swelling or tenderness to palpation  NEURO: moves all limbs spontaneously  SKIN: No rashes      VS  T(C): 36.6 (08-30-24 @ 12:26), Max: 36.7 (08-29-24 @ 22:13)  HR: 74 (08-30-24 @ 12:26) (60 - 74)  BP: 127/85 (08-30-24 @ 12:26) (121/54 - 127/85)  RR: 18 (08-30-24 @ 12:26) (17 - 18)  SpO2: 100% (08-30-24 @ 12:26) (100% - 100%)    LABS (available at time of writing 08-30-24 @ 16:58):                     Medications:   acetaminophen     Tablet .. 650 milliGRAM(s) Oral every 6 hours PRN  amLODIPine   Tablet 7.5 milliGRAM(s) Oral daily  hydrochlorothiazide 25 milliGRAM(s) Oral daily  losartan 100 milliGRAM(s) Oral daily  melatonin 3 milliGRAM(s) Oral at bedtime PRN  metoprolol succinate ER 25 milliGRAM(s) Oral at bedtime  piperacillin/tazobactam IVPB.. 3.375 Gram(s) IV Intermittent every 8 hours      RADIOLOGY, EKG & ADDITIONAL TESTS: Reviewed.

## 2024-08-30 NOTE — PROGRESS NOTE ADULT - SUBJECTIVE AND OBJECTIVE BOX
Infectious Diseases Follow Up:    Patient is a 83y old  Female who presents with a chief complaint of L 1st toe infection (30 Aug 2024 07:18)      Interval History/ROS:  No acute events, pt is feeling okay, undecided about treatment plan.     Allergies  iodinated radiocontrast agents (Chills)  Prevacid (Chills)        ANTIMICROBIALS:  piperacillin/tazobactam IVPB.. 3.375 every 8 hours      Current Abx:     Previous Abx     OTHER MEDS:  MEDICATIONS  (STANDING):  acetaminophen     Tablet .. 650 every 6 hours PRN  amLODIPine   Tablet 7.5 daily  hydrochlorothiazide 25 daily  losartan 100 daily  melatonin 3 at bedtime PRN  metoprolol succinate ER 25 at bedtime      Vital Signs Last 24 Hrs  T(C): 36.7 (30 Aug 2024 05:14), Max: 36.7 (29 Aug 2024 22:13)  T(F): 98.1 (30 Aug 2024 05:14), Max: 98.1 (29 Aug 2024 22:13)  HR: 60 (30 Aug 2024 05:14) (60 - 69)  BP: 122/60 (30 Aug 2024 05:14) (114/70 - 122/60)  BP(mean): --  RR: 17 (30 Aug 2024 05:14) (17 - 17)  SpO2: 100% (30 Aug 2024 05:14) (100% - 100%)    Parameters below as of 30 Aug 2024 05:14  Patient On (Oxygen Delivery Method): room air      PHYSICAL EXAM:  GENERAL: NAD, well-developed  HEAD:  Atraumatic, Normocephalic  EYES: EOMI, conjunctiva and sclera clear  CHEST/LUNG: On RA, not in respiratory distress  EXTREMITIES:  L foot nail bed removed, wrapped, dressing c/d/i  PSYCH: AAOx3          MICROBIOLOGY:  v  .Blood Blood  08-22-24   No growth at 5 days  --  --      .Blood Blood-Peripheral  08-22-24   No growth at 5 days  --  --      .Abscess left hallux  08-22-24   Few Pseudomonas aeruginosa  Moderate Enterococcus avium  Few Enterococcus faecalis  Moderate Bacteroides fragilis "Susceptibilities not performed"  Rare Proteus mirabilis  --  Pseudomonas aeruginosa  Enterococcus avium  Enterococcus faecalis  Proteus mirabilis                RADIOLOGY:

## 2024-08-31 LAB
ANION GAP SERPL CALC-SCNC: 11 MMOL/L — SIGNIFICANT CHANGE UP (ref 7–14)
BUN SERPL-MCNC: 23 MG/DL — SIGNIFICANT CHANGE UP (ref 7–23)
CALCIUM SERPL-MCNC: 9.3 MG/DL — SIGNIFICANT CHANGE UP (ref 8.4–10.5)
CHLORIDE SERPL-SCNC: 106 MMOL/L — SIGNIFICANT CHANGE UP (ref 98–107)
CO2 SERPL-SCNC: 23 MMOL/L — SIGNIFICANT CHANGE UP (ref 22–31)
CREAT SERPL-MCNC: 0.99 MG/DL — SIGNIFICANT CHANGE UP (ref 0.5–1.3)
EGFR: 56 ML/MIN/1.73M2 — LOW
GLUCOSE SERPL-MCNC: 87 MG/DL — SIGNIFICANT CHANGE UP (ref 70–99)
POTASSIUM SERPL-MCNC: 4.1 MMOL/L — SIGNIFICANT CHANGE UP (ref 3.5–5.3)
POTASSIUM SERPL-SCNC: 4.1 MMOL/L — SIGNIFICANT CHANGE UP (ref 3.5–5.3)
SODIUM SERPL-SCNC: 140 MMOL/L — SIGNIFICANT CHANGE UP (ref 135–145)

## 2024-08-31 PROCEDURE — 93010 ELECTROCARDIOGRAM REPORT: CPT

## 2024-08-31 PROCEDURE — 99232 SBSQ HOSP IP/OBS MODERATE 35: CPT

## 2024-08-31 RX ADMIN — PIPERACILLIN SODIUM AND TAZOBACTAM SODIUM 25 GRAM(S): 3; .375 INJECTION, POWDER, FOR SOLUTION INTRAVENOUS at 19:43

## 2024-08-31 RX ADMIN — METOPROLOL TARTRATE 25 MILLIGRAM(S): 100 TABLET ORAL at 21:59

## 2024-08-31 RX ADMIN — LOSARTAN POTASSIUM 100 MILLIGRAM(S): 50 TABLET ORAL at 05:45

## 2024-08-31 RX ADMIN — PIPERACILLIN SODIUM AND TAZOBACTAM SODIUM 25 GRAM(S): 3; .375 INJECTION, POWDER, FOR SOLUTION INTRAVENOUS at 03:49

## 2024-08-31 RX ADMIN — PIPERACILLIN SODIUM AND TAZOBACTAM SODIUM 25 GRAM(S): 3; .375 INJECTION, POWDER, FOR SOLUTION INTRAVENOUS at 12:14

## 2024-08-31 RX ADMIN — AMLODIPINE BESYLATE 7.5 MILLIGRAM(S): 10 TABLET ORAL at 05:44

## 2024-08-31 NOTE — PROGRESS NOTE ADULT - SUBJECTIVE AND OBJECTIVE BOX
Patient is a 83y old  Female who presents with a chief complaint of L 1st toe infection (24 Aug 2024 09:25)      SUBJECTIVE / OVERNIGHT EVENTS:   - no Acute overnight events  - VS WNL  - S/p Vanc, continuing Zosyn  - NGTD on BCx  - Discussion pending w/ daughter      PHYSICAL EXAM:  GENERAL: NAD, lying in bed comfortably  HEAD:  Atraumatic, normocephalic  EYES: EOMI, , conjunctiva clear, no conjunctival pallor, anicteric sclera  HEART: Regular rate and rhythm, Normal S1 S2, no murmurs, rubs, or gallops  LUNGS: Unlabored respirations. Clear to ascultation bilaterally, no crackles, wheezing, or rhonchi  ABDOMEN: Soft, nondistended, nontender, no rebound or guarding, bowel sounds presents  EXTREMITIES: Warm extremities, no clubbing, cyanosis, or edema, peripheral pulses 2+ bilaterally. (+) L hallux dressing in place - no significant purulent drainage from nail be upon inspection.   MUSCULOSKELETAL: No joint swelling or tenderness to palpation  NEURO: moves all limbs spontaneously  SKIN: No rashes      VS  T(C): 36.7 (08-31-24 @ 05:41), Max: 36.7 (08-31-24 @ 05:41)  HR: 85 (08-31-24 @ 05:41) (62 - 85)  BP: 137/67 (08-31-24 @ 05:41) (108/55 - 137/67)  RR: 17 (08-31-24 @ 05:41) (17 - 18)  SpO2: 100% (08-31-24 @ 05:41) (100% - 100%)    LABS (available at time of writing 08-31-24 @ 07:31):                     Medications:   acetaminophen     Tablet .. 650 milliGRAM(s) Oral every 6 hours PRN  amLODIPine   Tablet 7.5 milliGRAM(s) Oral daily  hydrochlorothiazide 25 milliGRAM(s) Oral daily  losartan 100 milliGRAM(s) Oral daily  melatonin 3 milliGRAM(s) Oral at bedtime PRN  metoprolol succinate ER 25 milliGRAM(s) Oral at bedtime  piperacillin/tazobactam IVPB.. 3.375 Gram(s) IV Intermittent every 8 hours      RADIOLOGY, EKG & ADDITIONAL TESTS: Reviewed.    Patient is a 83y old  Female who presents with a chief complaint of L 1st toe infection (24 Aug 2024 09:25)      SUBJECTIVE / OVERNIGHT EVENTS:   - no Acute overnight events  - VS WNL  - S/p Vanc, continuing Zosyn  - NGTD on BCx  - Discussion pending w/ daughter regarding IV vs PO antibiotics      PHYSICAL EXAM:  GENERAL: NAD, lying in bed comfortably  HEAD:  Atraumatic, normocephalic  EYES: EOMI, , conjunctiva clear, no conjunctival pallor, anicteric sclera  HEART: Regular rate and rhythm, Normal S1 S2, no murmurs, rubs, or gallops  LUNGS: Unlabored respirations. Clear to ascultation bilaterally, no crackles, wheezing, or rhonchi  ABDOMEN: Soft, nondistended, nontender, no rebound or guarding, bowel sounds presents  EXTREMITIES: Warm extremities, no clubbing, cyanosis, or edema, peripheral pulses 2+ bilaterally. (+) L hallux dressing in place - no significant purulent drainage from nail be upon inspection.   MUSCULOSKELETAL: No joint swelling or tenderness to palpation  NEURO: moves all limbs spontaneously  SKIN: No rashes      VS  T(C): 36.7 (08-31-24 @ 05:41), Max: 36.7 (08-31-24 @ 05:41)  HR: 85 (08-31-24 @ 05:41) (62 - 85)  BP: 137/67 (08-31-24 @ 05:41) (108/55 - 137/67)  RR: 17 (08-31-24 @ 05:41) (17 - 18)  SpO2: 100% (08-31-24 @ 05:41) (100% - 100%)    LABS (available at time of writing 08-31-24 @ 07:31):                     Medications:   acetaminophen     Tablet .. 650 milliGRAM(s) Oral every 6 hours PRN  amLODIPine   Tablet 7.5 milliGRAM(s) Oral daily  hydrochlorothiazide 25 milliGRAM(s) Oral daily  losartan 100 milliGRAM(s) Oral daily  melatonin 3 milliGRAM(s) Oral at bedtime PRN  metoprolol succinate ER 25 milliGRAM(s) Oral at bedtime  piperacillin/tazobactam IVPB.. 3.375 Gram(s) IV Intermittent every 8 hours      RADIOLOGY, EKG & ADDITIONAL TESTS: Reviewed.

## 2024-08-31 NOTE — PROGRESS NOTE ADULT - PROBLEM SELECTOR PLAN 1
L 1st toe infection presenting with pain, drainage, mild erythema. Xray: Focal soft tissue swelling along the distal first phalanx. Cortical erosion along the medial aspect of the first distal phalanx, concerning for osteomyelitis. ESR 36, CRP 8.6. Many Discussions between patient, daughter, and ID regarding surgical vs. PO vs. IV options.    - Awaiting family decision. Options presented:      - Surgery + shorter course Abx      - IV Zosyn w/ PICC  x6 weeks      - PO Cipro + Augmentin x6 weeks    - Seen by podiatry: nail plate removed. Wound to bone with purulence identified. Resection offered to patient - declined   - MRI: Osteomyelitis of the first digit distal phalanx.  - WC: Pseudomonas and Enterococcus spp  - S/p Vanco (8/22 - 8/25) per ID recs  - Continue Zosyn (8/22 - ) per ID recs  - ID following, appreciate recs  - A1c 5.9  - KIMBERLY showed calcified arteries bilaterally (indeterminate perfusion results)

## 2024-08-31 NOTE — PROGRESS NOTE ADULT - ASSESSMENT
1 person assist 83F with PMHx HTN, HLD, CKD, bilateral carpal tunnel syndrome, GERD presenting with L 1st toe pain, drainage x3 weeks. Admitted for L 1st toe osteomyelitis and undergoing consideration for partial amputation of L hallux. Pending decision from daughter and family regarding surgery vs. IV Zosyn only w/PICC vs. PO Cipro + Augmentin.

## 2024-09-01 PROCEDURE — 99232 SBSQ HOSP IP/OBS MODERATE 35: CPT

## 2024-09-01 RX ORDER — AMOXICILLIN AND CLAVULANATE POTASSIUM 250; 125 MG/1; MG/1
1 TABLET, FILM COATED ORAL
Qty: 60 | Refills: 0
Start: 2024-09-01 | End: 2024-09-30

## 2024-09-01 RX ORDER — AMOXICILLIN AND CLAVULANATE POTASSIUM 250; 125 MG/1; MG/1
1 TABLET, FILM COATED ORAL
Refills: 0 | Status: DISCONTINUED | OUTPATIENT
Start: 2024-09-01 | End: 2024-09-01

## 2024-09-01 RX ORDER — AMOXICILLIN AND CLAVULANATE POTASSIUM 250; 125 MG/1; MG/1
1 TABLET, FILM COATED ORAL
Refills: 0 | Status: DISCONTINUED | OUTPATIENT
Start: 2024-09-01 | End: 2024-09-02

## 2024-09-01 RX ADMIN — LOSARTAN POTASSIUM 100 MILLIGRAM(S): 50 TABLET ORAL at 06:04

## 2024-09-01 RX ADMIN — Medication 500 MILLIGRAM(S): at 12:30

## 2024-09-01 RX ADMIN — PIPERACILLIN SODIUM AND TAZOBACTAM SODIUM 25 GRAM(S): 3; .375 INJECTION, POWDER, FOR SOLUTION INTRAVENOUS at 04:18

## 2024-09-01 RX ADMIN — AMLODIPINE BESYLATE 7.5 MILLIGRAM(S): 10 TABLET ORAL at 06:03

## 2024-09-01 RX ADMIN — AMOXICILLIN AND CLAVULANATE POTASSIUM 1 TABLET(S): 250; 125 TABLET, FILM COATED ORAL at 15:42

## 2024-09-01 NOTE — PROGRESS NOTE ADULT - ASSESSMENT
83F with PMHx HTN, HLD, CKD, bilateral carpal tunnel syndrome, GERD presenting with L 1st toe pain, drainage x3 weeks. Admitted for L 1st toe osteomyelitis and undergoing consideration for partial amputation of L hallux. Pending decision from daughter and family regarding surgery vs. IV Zosyn only w/PICC vs. PO Cipro + Augmentin.

## 2024-09-01 NOTE — DISCHARGE NOTE NURSING/CASE MANAGEMENT/SOCIAL WORK - NSDCFUADDAPPT_GEN_ALL_CORE_FT
APPTS ARE READY TO BE MADE: [ x] YES    Best Family or Patient Contact (if needed):    Additional Information about above appointments (if needed):    1: Infectious Disease (Dr. Ankur Centeno)  2: Your podiatrist  3: Your PCP    Other comments or requests:   Podiatry Discharge Instructions:  Follow up: Please follow up with Dr. Acosta within 1 week of discharge from the hospital, please call [phone] for appointment and discuss that you recently were seen in the hospital.  Wound Care: Please apply betadine, 4x4 guaze, and ramo to left foot wound daily.   Weight bearing: Please weight bear as tolerated in a surgical shoe.  Antibiotics: Please continue as instructed.        Appointment was scheduled by our team on the patient's behalf through the provider's office for Podiatry with Dr. Acosta for 9/3/2024 at 10:15am, [address], [place] [address].     A [facility] providers office was contacted to secure an appointment for Infectious Disease with Dr. Ankur Centeno, however the office will follow up with the patient/caregiver directly.     Met with the patient face to face, however they advised they prefer to coordinate the care on their own for follow-up appointment with PCP.

## 2024-09-01 NOTE — PROGRESS NOTE ADULT - ATTENDING COMMENTS
agree with assessment and plan above
82 yo with HTN p/w 1 month toe pain found to have osteomyelitis of L 1st toe    #Osteomyelitis:   - MRI with Osteomyelitis of the first digit distal phalanx  - abscess culture with pseudomonas, e.faecalis, and e.avium   - KIMBERLY/PVR     1. Right: arterial wall calcification precludes accurate ankle pressures and ABIs.   2. Left: arterial wall calcification precludes accurate ankle pressures and ABIs.   3. Right: Waveforms appear diminished in amplitude at the right ankle and metatarsal segment.   4. Left: Waveforms appear diminished in amplitude at the left digit segment.  Plan:  - podiatry and ID recs appreciated  - continue zosyn  - podiatry recommends resection, but pt prefers medical management. will f/u with ID abx regimen    #HTN continue losartan/HCTZ 100-25mg qd, metoprolol succinate 25mg qhs, amlodipine 7.5mg qd  #GERd: continue PPI  #Dispo: home with antibiotics  rest as above
84 yo with HTN p/w 1 month toe pain found to have osteomyelitis of L 1st toe    #Osteomyelitis:   - MRI with Osteomyelitis of the first digit distal phalanx  - abscess culture with pseudomonas, e.faecalis, and e.avium   - KIMBERLY/PVR     1. Right: arterial wall calcification precludes accurate ankle pressures and ABIs.   2. Left: arterial wall calcification precludes accurate ankle pressures and ABIs.   3. Right: Waveforms appear diminished in amplitude at the right ankle and metatarsal segment.   4. Left: Waveforms appear diminished in amplitude at the left digit segment.  Plan:  - podiatry and ID recs appreciated  - continue zosyn  - podiatry recommends resection, but pt prefers medical management. will f/u with ID abx regimen    #HTN continue losartan/HCTZ 100-25mg qd, metoprolol succinate 25mg qhs, amlodipine 7.5mg qd  #GERd: continue PPI  #Dispo: home with antibiotics  rest as above
82 yo with HTN p/w 1 month toe pain found to have osteomyelitis of L 1st toe    #Osteomyelitis:   - MRI with Osteomyelitis of the first digit distal phalanx  - abscess culture with pseudomonas, e.faecalis, and e.avium   - KIMBERLY/PVR     1. Right: arterial wall calcification precludes accurate ankle pressures and ABIs.   2. Left: arterial wall calcification precludes accurate ankle pressures and ABIs.   3. Right: Waveforms appear diminished in amplitude at the right ankle and metatarsal segment.   4. Left: Waveforms appear diminished in amplitude at the left digit segment.  Plan:  - podiatry and ID recs appreciated  - continue zosyn  - podiatry recommends resection, but pt prefers medical management. will f/u with ID abx regimen    #HTN continue losartan/HCTZ 100-25mg qd, metoprolol succinate 25mg qhs, amlodipine 7.5mg qd  #GERd: continue PPI  #Dispo: PT eval  rest as above
82 yo with HTN p/w 1 month toe pain found to have osteomyelitis of L 1st toe    #Osteomyelitis:   - MRI with Osteomyelitis of the first digit distal phalanx  - abscess culture with pseudomonas, e.faecalis, and e.avium   Plan:  - podiatry and ID recs appreciated  - continue zosyn  - fu KIMBERLY/PVR  - podiatry recommends partial ray resection. pt discussing with daughter  - RCRI 0, jones .2%. denies chest pain or sob, but does have limited activity 2/2 scoliosis. f/u TTE given hx of heart murmur    #HTN continue losartan/HCTZ 100-25mg qd, metoprolol succinate 25mg qhs, amlodipine 7.5mg qd  #GERd: continue PPI  #Dispo: PT eval  rest as above
82 yo with HTN p/w 1 month toe pain found to have osteomyelitis of L 1st toe    #Osteomyelitis: MRI with Osteomyelitis of the first digit distal phalanx. ID following, continue vanco/zosyn for now. blood culture neg, abscess culture with e.faecalis and e.avium. fu KIMBERLY/PVR. podiatry plan recommends partial ray resection but pt currently unsure and wants to hear from ID.   #HTN continue losartan/HCTZ 100-25mg qd, metoprolol succinate 25mg qhs, amlodipine 7.5mg qd  #GERd: continue PPI  #Dispo: PT eval  rest as above
82 yo with HTN p/w 1 month toe pain found to have osteomyelitis of L toe    #Osteomyelitis: seen on Xray, for MRI, ID following, continue vanco/zosyn for now, check KIMBERLY/PVR  #HTN continue losartan/HCTZ 100-25mg qd, metoprolol succinate 25mg qhs, amlodipine 7.5mg qd  #GERd: continue PPI  #Dispo: PT eval  rest as above  case d/w HS and patient
84 yo with HTN p/w 1 month toe pain found to have osteomyelitis of L 1st toe    #Osteomyelitis:   - MRI with Osteomyelitis of the first digit distal phalanx  - abscess culture with pseudomonas, e.faecalis, and e.avium   - KIMBERLY/PVR     1. Right: arterial wall calcification precludes accurate ankle pressures and ABIs.   2. Left: arterial wall calcification precludes accurate ankle pressures and ABIs.   3. Right: Waveforms appear diminished in amplitude at the right ankle and metatarsal segment.   4. Left: Waveforms appear diminished in amplitude at the left digit segment.  Plan:  - podiatry and ID recs appreciated  - continue zosyn  - podiatry recommends partial ray resection. pt discussing with daughter  - RCRI 0, jones .2%. denies chest pain or sob, but does have limited activity 2/2 scoliosis. TTE wnl    #HTN continue losartan/HCTZ 100-25mg qd, metoprolol succinate 25mg qhs, amlodipine 7.5mg qd  #GERd: continue PPI  #Dispo: PT eval  rest as above
84 yo with HTN p/w 1 month toe pain found to have osteomyelitis of L 1st toe    #Osteomyelitis:   - MRI with Osteomyelitis of the first digit distal phalanx  - abscess culture with pseudomonas, e.faecalis, and e.avium   - KIMBERLY/PVR     1. Right: arterial wall calcification precludes accurate ankle pressures and ABIs.   2. Left: arterial wall calcification precludes accurate ankle pressures and ABIs.   3. Right: Waveforms appear diminished in amplitude at the right ankle and metatarsal segment.   4. Left: Waveforms appear diminished in amplitude at the left digit segment.  Plan:  - podiatry and ID recs appreciated  - continue zosyn  - podiatry recommends resection, but pt prefers medical management. will f/u with ID abx regimen    #HTN continue losartan/HCTZ 100-25mg qd, metoprolol succinate 25mg qhs, amlodipine 7.5mg qd  #GERd: continue PPI  #Dispo: home with antibiotics  rest as above
84 yo with HTN p/w 1 month toe pain found to have osteomyelitis of L 1st toe    #Osteomyelitis:   - MRI with Osteomyelitis of the first digit distal phalanx  - abscess culture with pseudomonas, e.faecalis, and e.avium   Plan:  - podiatry and ID recs appreciated  - continue zosyn  - fu KIMBERLY/PVR  - podiatry recommends partial ray resection. pt amenable  - RCRI 0, jones .2%. denies chest pain or sob, but does have limited activity 2/2 scoliosis. f/u TTE given hx of heart murmur    #HTN continue losartan/HCTZ 100-25mg qd, metoprolol succinate 25mg qhs, amlodipine 7.5mg qd  #GERd: continue PPI  #Dispo: PT eval  rest as above
84 yo with HTN p/w 1 month toe pain found to have osteomyelitis of L 1st toe    #Osteomyelitis: MRI with Osteomyelitis of the first digit distal phalanx. ID following, continue vanco/zosyn for now, fu KIMBERLY/PVR. podiatry plan for possible partial ray resection vs. local care pending pts final decision. will fu  #HTN continue losartan/HCTZ 100-25mg qd, metoprolol succinate 25mg qhs, amlodipine 7.5mg qd  #GERd: continue PPI  #Dispo: PT eval  rest as above

## 2024-09-01 NOTE — PROGRESS NOTE ADULT - SUBJECTIVE AND OBJECTIVE BOX
INTERVAL HPI/OVERNIGHT EVENTS:    Patient was seen and examined at bedside. As per nurse and patient, no o/n events, patient resting comfortably. No complaints at this time. Patient denies: fever, chills, dizziness, weakness, HA, CP, palpitations, SOB, cough, N/V/D/C, dysuria, changes in bowel movements, LE edema.    ROS: as above    VITAL SIGNS:  T(F): 97.9 (09-01-24 @ 05:59)  HR: 66 (09-01-24 @ 05:59)  BP: 131/58 (09-01-24 @ 05:59)  RR: 17 (09-01-24 @ 05:59)  SpO2: 100% (09-01-24 @ 05:59)  Wt(kg): --    PHYSICAL EXAM:    Constitutional: resting confortably, in no acute distress  Eyes: PERRL, sclera non-icteric  Neck: supple, trachea midline, no masses, no JVD  Respiratory: CTA b/l, good air entry b/l, no wheezing, no rhonchi, no rales, without accessory muscle use and no intercostal retractions  Cardiovascular: RRR, normal S1S2, no M/R/G  Gastrointestinal: soft, NTND, no masses palpable, BS normal  Extremities: Warm, well perfused, pulses equal bilateral upper and lower extremities, no edema, no clubbing  Neurological: AAOx3, CN Grossly intact  Skin: Normal temperature, warm, dry    MEDICATIONS  (STANDING):  amLODIPine   Tablet 7.5 milliGRAM(s) Oral daily  hydrochlorothiazide 25 milliGRAM(s) Oral daily  losartan 100 milliGRAM(s) Oral daily  metoprolol succinate ER 25 milliGRAM(s) Oral at bedtime  piperacillin/tazobactam IVPB.. 3.375 Gram(s) IV Intermittent every 8 hours    MEDICATIONS  (PRN):  acetaminophen     Tablet .. 650 milliGRAM(s) Oral every 6 hours PRN Temp greater or equal to 38C (100.4F), Mild Pain (1 - 3), Moderate Pain (4 - 6)  melatonin 3 milliGRAM(s) Oral at bedtime PRN Insomnia      Allergies    iodinated radiocontrast agents (Chills)  Prevacid (Chills)    Intolerances        LABS:    08-31    140  |  106  |  23  ----------------------------<  87  4.1   |  23  |  0.99    Ca    9.3      31 Aug 2024 06:18        Urinalysis Basic - ( 31 Aug 2024 06:18 )    Color: x / Appearance: x / SG: x / pH: x  Gluc: 87 mg/dL / Ketone: x  / Bili: x / Urobili: x   Blood: x / Protein: x / Nitrite: x   Leuk Esterase: x / RBC: x / WBC x   Sq Epi: x / Non Sq Epi: x / Bacteria: x        RADIOLOGY & ADDITIONAL TESTS:

## 2024-09-01 NOTE — DISCHARGE NOTE NURSING/CASE MANAGEMENT/SOCIAL WORK - NSFLUVACAGEDISCH_IMM_ALL_CORE
Pt with h/o HTN, DM, ESRD on PD (since 2/2022), originally from the [place] here visiting her family, p/w PD catheter dysfunction - has not been able to instill dialysate but has been able to drain. On admission, had hyperkalemia to 5.7 which was medically treated and resolved on repeat labs. Pt had CT scan which showed PD catheter terminating in the right lower quadrant without evidence of discontinuity or kinking, no evidence of ascites or intra-abdominal fluid collection. She was seen by the nephrology team and had two exchanges performed in the hospital successfully using her PD catheter. Deemed stable for discharge home by medicine and renal team.   Pt has all her PD equipment from her affiliated HD .      Adult Pt with h/o HTN, DM, ESRD on PD (since 2/2022), originally from the [place] here visiting her family, p/w PD catheter dysfunction - has not been able to instill dialysate but has been able to drain. On admission, had hyperkalemia to 5.7 which was medically treated and resolved on repeat labs. Pt had CT scan which showed PD catheter terminating in the right lower quadrant without evidence of discontinuity or kinking, no evidence of ascites or intra-abdominal fluid collection. She was seen by the nephrology team and had two exchanges performed in the hospital successfully using her PD catheter.  Deemed stable for discharge home by medicine and renal team.   Pt has all her PD equipment from her affiliated HD .

## 2024-09-01 NOTE — PROGRESS NOTE ADULT - TIME BILLING
Reviewing the chart, interpreting lab data, discussing case with team, interview and examination of patient, and documentation.
Time-based billing (NON-critical care).     More than 50% of the visit was spent counseling and / or coordinating care by the attending physician.      The necessity of the time spent during the encounter on this date of service was due to: documentation in [place], reviewing chart and coordinating care with patient/residents and interdisciplinary staff (such as , social workers, etc) as well as reviewing vitals, laboratory data, radiology, medication list, consultants' recommendations and prior records. Interventions were performed as documented above.
Time-based billing (NON-critical care).     More than 50% of the visit was spent counseling and / or coordinating care by the attending physician.      The necessity of the time spent during the encounter on this date of service was due to: documentation in [place], reviewing chart and coordinating care with patient/residents and interdisciplinary staff (such as , social workers, etc) as well as reviewing vitals, laboratory data, radiology, medication list, consultants' recommendations and prior records. Interventions were performed as documented above.
Time-based billing (NON-critical care).     More than 50% of the visit was spent counseling and / or coordinating care by the attending physician.      The necessity of the time spent during the encounter on this date of service was due to: documentation in [place], reviewing chart and coordinating care with patient/residents and interdisciplinary staff (such as , social workers, etc) as well as reviewing vitals, laboratory data, radiology, medication list, consultants' recommendations and prior records. Interventions were performed as documented above.
Time-based billing (NON-critical care).     More than 50% of the visit was spent counseling and / or coordinating care by the attending physician.      The necessity of the time spent during the encounter on this date of service was due to: documentation in [place], reviewing chart and coordinating care with patient/residents and interdisciplinary staff (such as , social workers, etc) as well as reviewing vitals, laboratory data, radiology, medication list, consultants' recommendations and prior records. Interventions were performed as documented above.
Time-based billing (NON-critical care).     More than 50% of the visit was spent counseling and / or coordinating care by the attending physician.      The necessity of the time spent during the encounter on this date of service was due to: documentation in [place], reviewing chart and coordinating care with patient/residents and interdisciplinary staff (such as , social workers, etc) as well as reviewing vitals, laboratory data, radiology, medication list, consultants' recommendations and prior records. Interventions were performed as documented above.
Time-based billing (NON-critical care).     More than 50% of the visit was spent counseling and / or coordinating care by the attending physician.      The necessity of the time spent during the encounter on this date of service was due to: documentation in [place], reviewing chart and coordinating care with patient/residents and interdisciplinary staff (such as , social workers, etc) as well as reviewing vitals, laboratory data, radiology, medication list, consultants' recommendations and prior records. Interventions were performed as documented above.
Time-based billing (NON-critical care).     More than 50% of the visit was spent counseling and / or coordinating care by the attending physician.      The necessity of the time spent during the encounter on this date of service was due to: documentation in [place], reviewing chart and coordinating care with patient/residents and interdisciplinary staff (such as , social workers, etc) as well as reviewing vitals, laboratory data, radiology, medication list, consultants' recommendations and prior records. Interventions were performed as documented above.

## 2024-09-01 NOTE — DISCHARGE NOTE NURSING/CASE MANAGEMENT/SOCIAL WORK - PATIENT PORTAL LINK FT
You can access the FollowMyHealth Patient Portal offered by [facility] by registering at the following website: [email or web address]. By joining [employer]’s FollowMyHealth portal, you will also be able to view your health information using other applications (apps) compatible with our system.

## 2024-09-01 NOTE — PROGRESS NOTE ADULT - PROBLEM SELECTOR PLAN 1
L 1st toe infection presenting with pain, drainage, mild erythema. Xray: Focal soft tissue swelling along the distal first phalanx. Cortical erosion along the medial aspect of the first distal phalanx, concerning for osteomyelitis. ESR 36, CRP 8.6. Many Discussions between patient, daughter, and ID regarding surgical vs. PO vs. IV options.    - Awaiting family decision. Options presented:      - Surgery + shorter course Abx      - IV Zosyn w/ PICC  x6 weeks      - PO Cipro + Augmentin x6 weeks    - Seen by podiatry: nail plate removed. Wound to bone with purulence identified. Resection offered to patient - declined   - MRI: Osteomyelitis of the first digit distal phalanx.  - WC: Pseudomonas and Enterococcus spp  - S/p Vanco (8/22 - 8/25) per ID recs  - Continue Zosyn (8/22 - ) per ID recs  - ID following, appreciate recs  - A1c 5.9  - KIMBERLY showed calcified arteries bilaterally (indeterminate perfusion results) L 1st toe infection presenting with pain, drainage, mild erythema. Xray: Focal soft tissue swelling along the distal first phalanx. Cortical erosion along the medial aspect of the first distal phalanx, concerning for osteomyelitis. ESR 36, CRP 8.6. Many Discussions between patient, daughter, and ID regarding surgical vs. PO vs. IV options.    - Awaiting family decision. Options presented:      - Surgery + shorter course Abx      - IV Zosyn w/ PICC  x6 weeks      - PO Cipro + Augmentin x6 weeks  Pt chose  PO Cipro + Augmentin x6 weeks on 9/1/24    - Seen by podiatry: nail plate removed. Wound to bone with purulence identified. Resection offered to patient - declined   - MRI: Osteomyelitis of the first digit distal phalanx.  - WC: Pseudomonas and Enterococcus spp  - S/p Vanco (8/22 - 8/25) per ID recs  - Continue Zosyn (8/22 - ) per ID recs  - ID following, appreciate recs  - A1c 5.9  - KIMBERLY showed calcified arteries bilaterally (indeterminate perfusion results)

## 2024-09-02 VITALS
HEART RATE: 70 BPM | OXYGEN SATURATION: 100 % | SYSTOLIC BLOOD PRESSURE: 132 MMHG | RESPIRATION RATE: 18 BRPM | TEMPERATURE: 98 F | DIASTOLIC BLOOD PRESSURE: 67 MMHG

## 2024-09-02 PROCEDURE — 99239 HOSP IP/OBS DSCHRG MGMT >30: CPT

## 2024-09-02 RX ORDER — AMOXICILLIN AND CLAVULANATE POTASSIUM 250; 125 MG/1; MG/1
875 TABLET, FILM COATED ORAL
Qty: 61 | Refills: 0
Start: 2024-09-02

## 2024-09-02 RX ADMIN — LOSARTAN POTASSIUM 100 MILLIGRAM(S): 50 TABLET ORAL at 05:35

## 2024-09-02 RX ADMIN — AMLODIPINE BESYLATE 7.5 MILLIGRAM(S): 10 TABLET ORAL at 05:35

## 2024-09-02 RX ADMIN — AMOXICILLIN AND CLAVULANATE POTASSIUM 1 TABLET(S): 250; 125 TABLET, FILM COATED ORAL at 05:35

## 2024-09-02 RX ADMIN — Medication 500 MILLIGRAM(S): at 11:37

## 2024-09-02 NOTE — PROGRESS NOTE ADULT - PROVIDER SPECIALTY LIST ADULT
Internal Medicine
Internal Medicine
Podiatry
Internal Medicine
Podiatry
Infectious Disease
Podiatry
Infectious Disease
Infectious Disease
Podiatry
Infectious Disease
Internal Medicine

## 2024-09-02 NOTE — PROGRESS NOTE ADULT - PROBLEM SELECTOR PROBLEM 1
Osteomyelitis

## 2024-09-02 NOTE — PROGRESS NOTE ADULT - ASSESSMENT
83F with PMHx HTN, HLD, CKD, bilateral carpal tunnel syndrome, GERD presenting with L 1st toe pain, drainage x3 weeks. Admitted for L 1st toe osteomyelitis and undergoing consideration for partial amputation of L hallux. Pending decision from daughter and family regarding surgery vs. IV Zosyn only w/PICC vs. PO Cipro + Augmentin. 83F with PMHx HTN, HLD, CKD, bilateral carpal tunnel syndrome, GERD presenting with L 1st toe pain, drainage x3 weeks. Admitted for L 1st toe osteomyelitis and undergoing consideration for partial amputation of L hallux. Patient and family opted for local wound care with PO antibiotics on discharge.

## 2024-09-02 NOTE — PROGRESS NOTE ADULT - PROBLEM SELECTOR PLAN 4
DVT: SCD and encourage ambulation (IMPROVE 1)  Diet: DASH/TLC diet  Dispo: Pending DVT: SCD and encourage ambulation (IMPROVE 1)  Diet: DASH/TLC diet  Dispo: Home with home RN for wound care

## 2024-09-02 NOTE — PROGRESS NOTE ADULT - ASSESSMENT
83F presents for left hallux wound  -Pt was seen and evaluated  -Afebrile, no leukocytosis  -Left dorsal hallux wound to bone at the nail bed and within the first interspace, improving erythema globally about the first digit, mild malodor, no drainage, no crepitus  -Left foot X-Ray: Focal soft tissue swelling of the distal first phalanx with underlying cortical erosion of the tuft. Findings concerning for osteomyelitis.  -Left foot MRI: OM first distal phalanx  -Left foot wound culture: Enterococcus faecalis/avium, Proteus mirabilis, Pseudomonas, few Bacteriodes   -ID recs, appreciated   -Extensive discussion with patient regarding the necessity for the amputation as the treatment of the infected bone, and antibiotics is the not optimal treatment with possible complication of sepsis loss of medina and life loss, pt showed verbal understanding. Patient elects for PO antibiotics.  -Stable for discharge from podiatry standpoint  -Follow-up information and wound care instructions discharge note provider   -Discussed with attending

## 2024-09-02 NOTE — PROGRESS NOTE ADULT - SUBJECTIVE AND OBJECTIVE BOX
PATIENT: JOVITA CRAIG, MRN: [id number]    CHIEF COMPLAINT: Patient is a 83y old  Female who presents with a chief complaint of L 1st toe infection (02 Sep 2024 06:05)      INTERVAL HISTORY/OVERNIGHT EVENTS: No overnight events.       MEDICATIONS:  MEDICATIONS  (STANDING):  amLODIPine   Tablet 7.5 milliGRAM(s) Oral daily  amoxicillin  500 milliGRAM(s)/clavulanate 1 Tablet(s) Oral two times a day  ciprofloxacin     Tablet 500 milliGRAM(s) Oral every 24 hours  hydrochlorothiazide 25 milliGRAM(s) Oral daily  losartan 100 milliGRAM(s) Oral daily  metoprolol succinate ER 25 milliGRAM(s) Oral at bedtime    MEDICATIONS  (PRN):  acetaminophen     Tablet .. 650 milliGRAM(s) Oral every 6 hours PRN Temp greater or equal to 38C (100.4F), Mild Pain (1 - 3), Moderate Pain (4 - 6)  melatonin 3 milliGRAM(s) Oral at bedtime PRN Insomnia      ALLERGIES: Allergies    iodinated radiocontrast agents (Chills)  Prevacid (Chills)    Intolerances        OBJECTIVE:  ICU Vital Signs Last 24 Hrs  T(C): 36.7 (02 Sep 2024 05:05), Max: 36.8 (01 Sep 2024 12:17)  T(F): 98.1 (02 Sep 2024 05:05), Max: 98.2 (01 Sep 2024 12:17)  HR: 62 (02 Sep 2024 05:05) (61 - 70)  BP: 116/60 (02 Sep 2024 05:05) (105/66 - 127/76)  BP(mean): --  ABP: --  ABP(mean): --  RR: 17 (02 Sep 2024 05:05) (17 - 18)  SpO2: 100% (02 Sep 2024 05:05) (100% - 100%)    O2 Parameters below as of 02 Sep 2024 05:05  Patient On (Oxygen Delivery Method): room air            CAPILLARY BLOOD GLUCOSE        CAPILLARY BLOOD GLUCOSE        I&O's Summary    Daily     Daily     PHYSICAL EXAMINATION:  GENERAL: NAD, lying in bed comfortably  HEAD:  Atraumatic, normocephalic  EYES: EOMI, , conjunctiva clear, no conjunctival pallor, anicteric sclera  HEART: Regular rate and rhythm, Normal S1 S2, no murmurs, rubs, or gallops  LUNGS: Unlabored respirations. Clear to ascultation bilaterally, no crackles, wheezing, or rhonchi  ABDOMEN: Soft, nondistended, nontender, no rebound or guarding, bowel sounds presents  EXTREMITIES: Warm extremities, no clubbing, cyanosis, or edema, peripheral pulses 2+ bilaterally. (+) L hallux dressing in place - no significant purulent drainage from nail be upon inspection.   MUSCULOSKELETAL: No joint swelling or tenderness to palpation  NEURO: moves all limbs spontaneously      LABS:                         PATIENT: JOVITA CRAIG, MRN: [id number]    CHIEF COMPLAINT: Patient is a 83y old  Female who presents with a chief complaint of L 1st toe infection (02 Sep 2024 06:05)      INTERVAL HISTORY/OVERNIGHT EVENTS: No overnight events. Patient reports no acute complaints. Denies fevers, chills, nausea, vomiting, dizziness, chest pain, SOB, abdominal pain, dysuria, hematuria.        MEDICATIONS:  MEDICATIONS  (STANDING):  amLODIPine   Tablet 7.5 milliGRAM(s) Oral daily  amoxicillin  500 milliGRAM(s)/clavulanate 1 Tablet(s) Oral two times a day  ciprofloxacin     Tablet 500 milliGRAM(s) Oral every 24 hours  hydrochlorothiazide 25 milliGRAM(s) Oral daily  losartan 100 milliGRAM(s) Oral daily  metoprolol succinate ER 25 milliGRAM(s) Oral at bedtime    MEDICATIONS  (PRN):  acetaminophen     Tablet .. 650 milliGRAM(s) Oral every 6 hours PRN Temp greater or equal to 38C (100.4F), Mild Pain (1 - 3), Moderate Pain (4 - 6)  melatonin 3 milliGRAM(s) Oral at bedtime PRN Insomnia      ALLERGIES: Allergies    iodinated radiocontrast agents (Chills)  Prevacid (Chills)    Intolerances        OBJECTIVE:  ICU Vital Signs Last 24 Hrs  T(C): 36.7 (02 Sep 2024 05:05), Max: 36.8 (01 Sep 2024 12:17)  T(F): 98.1 (02 Sep 2024 05:05), Max: 98.2 (01 Sep 2024 12:17)  HR: 62 (02 Sep 2024 05:05) (61 - 70)  BP: 116/60 (02 Sep 2024 05:05) (105/66 - 127/76)  BP(mean): --  ABP: --  ABP(mean): --  RR: 17 (02 Sep 2024 05:05) (17 - 18)  SpO2: 100% (02 Sep 2024 05:05) (100% - 100%)    O2 Parameters below as of 02 Sep 2024 05:05  Patient On (Oxygen Delivery Method): room air            CAPILLARY BLOOD GLUCOSE        CAPILLARY BLOOD GLUCOSE        I&O's Summary    Daily     Daily     PHYSICAL EXAMINATION:  GENERAL: NAD, lying in bed comfortably  HEAD:  Atraumatic, normocephalic  EYES: EOMI, , conjunctiva clear, no conjunctival pallor, anicteric sclera  HEART: Regular rate and rhythm, Normal S1 S2, no murmurs, rubs, or gallops  LUNGS: Unlabored respirations. Clear to ascultation bilaterally, no crackles, wheezing, or rhonchi  ABDOMEN: Soft, nondistended, nontender, no rebound or guarding, bowel sounds presents  EXTREMITIES: Warm extremities, no clubbing, cyanosis, or edema, peripheral pulses 2+ bilaterally. (+) L hallux dressing in place - no significant purulent drainage from nail be upon inspection.   MUSCULOSKELETAL: No joint swelling or tenderness to palpation  NEURO: moves all limbs spontaneously      LABS:

## 2024-09-02 NOTE — PROGRESS NOTE ADULT - PROBLEM SELECTOR PROBLEM 4
Need for prophylactic measure
bed

## 2024-09-02 NOTE — PROGRESS NOTE ADULT - SUBJECTIVE AND OBJECTIVE BOX
Patient is a 83y old  Female who presents with a chief complaint of L 1st toe infection (01 Sep 2024 07:10)       INTERVAL HPI/OVERNIGHT EVENTS:  Patient seen and evaluated at bedside.  Pt is resting comfortable in NAD. Denies N/V/F/C.    Allergies    iodinated radiocontrast agents (Chills)  Prevacid (Chills)    Intolerances        Vital Signs Last 24 Hrs  T(C): 36.7 (02 Sep 2024 05:05), Max: 36.8 (01 Sep 2024 12:17)  T(F): 98.1 (02 Sep 2024 05:05), Max: 98.2 (01 Sep 2024 12:17)  HR: 62 (02 Sep 2024 05:05) (61 - 70)  BP: 116/60 (02 Sep 2024 05:05) (105/66 - 127/76)  BP(mean): --  RR: 17 (02 Sep 2024 05:05) (17 - 18)  SpO2: 100% (02 Sep 2024 05:05) (100% - 100%)    Parameters below as of 02 Sep 2024 05:05  Patient On (Oxygen Delivery Method): room air        LABS:    08-31    140  |  106  |  23  ----------------------------<  87  4.1   |  23  |  0.99    Ca    9.3      31 Aug 2024 06:18        Urinalysis Basic - ( 31 Aug 2024 06:18 )    Color: x / Appearance: x / SG: x / pH: x  Gluc: 87 mg/dL / Ketone: x  / Bili: x / Urobili: x   Blood: x / Protein: x / Nitrite: x   Leuk Esterase: x / RBC: x / WBC x   Sq Epi: x / Non Sq Epi: x / Bacteria: x      CAPILLARY BLOOD GLUCOSE          Lower Extremity Physical Exam:  Vascular: DP/PT 2/4, B/L, CFT < 3 seconds B/L, Temperature gradient warm to cool, B/L.   Neuro: Epicritic sensation intact to the level of the digits, B/L.  Musculoskeletal/Ortho: unremarkable  Skin: left dorsal hallux wound to bone at the nail bed and within the first interspace,  improving erythema globally about the first digit, mild malodor, no  drainage, no crepitus    RADIOLOGY & ADDITIONAL TESTS:

## 2024-09-02 NOTE — PROGRESS NOTE ADULT - REASON FOR ADMISSION
L 1st toe infection

## 2024-09-02 NOTE — PROGRESS NOTE ADULT - PROBLEM SELECTOR PLAN 1
L 1st toe infection presenting with pain, drainage, mild erythema. Xray: Focal soft tissue swelling along the distal first phalanx. Cortical erosion along the medial aspect of the first distal phalanx, concerning for osteomyelitis. ESR 36, CRP 8.6. Many Discussions between patient, daughter, and ID regarding surgical vs. PO vs. IV options.    - Awaiting family decision. Options presented:      - Surgery + shorter course Abx      - IV Zosyn w/ PICC  x6 weeks      - PO Cipro + Augmentin x6 weeks  Pt chose  PO Cipro + Augmentin x6 weeks on 9/1/24    - Seen by podiatry: nail plate removed. Wound to bone with purulence identified. Resection offered to patient - declined   - MRI: Osteomyelitis of the first digit distal phalanx.  - WC: Pseudomonas and Enterococcus spp  - S/p Vanco (8/22 - 8/25) per ID recs  - Continue Zosyn (8/22 - ) per ID recs  - ID following, appreciate recs  - A1c 5.9  - KIMBERLY showed calcified arteries bilaterally (indeterminate perfusion results) L 1st toe infection presenting with pain, drainage, mild erythema. Xray: Focal soft tissue swelling along the distal first phalanx. Cortical erosion along the medial aspect of the first distal phalanx, concerning for osteomyelitis. ESR 36, CRP 8.6. Many Discussions between patient, daughter, and ID regarding surgical vs. PO vs. IV options.  Pt chose  local wound care with PO Cipro + Augmentin x6 weeks on 9/1/24  - Seen by podiatry: nail plate removed. Wound to bone with purulence identified. Resection offered to patient - declined   - MRI: Osteomyelitis of the first digit distal phalanx.  - WC: Pseudomonas and Enterococcus spp  - S/p Vanco (8/22 - 8/25) per ID recs  - ID following, appreciate recs  - A1c 5.9  - KIMBERLY showed calcified arteries bilaterally (indeterminate perfusion results)  - reduced dosing ciprofloxacin 500 daily and augmentin 500 BID per ID recs

## 2024-09-03 PROBLEM — E78.5 HYPERLIPIDEMIA, UNSPECIFIED: Chronic | Status: ACTIVE | Noted: 2024-08-23

## 2024-09-03 PROBLEM — I10 ESSENTIAL (PRIMARY) HYPERTENSION: Chronic | Status: ACTIVE | Noted: 2024-08-23

## 2024-09-03 PROBLEM — K21.9 GASTRO-ESOPHAGEAL REFLUX DISEASE WITHOUT ESOPHAGITIS: Chronic | Status: ACTIVE | Noted: 2024-08-23

## 2024-09-03 PROBLEM — N18.9 CHRONIC KIDNEY DISEASE, UNSPECIFIED: Chronic | Status: ACTIVE | Noted: 2024-08-23

## 2024-09-03 PROBLEM — Z86.69 PERSONAL HISTORY OF OTHER DISEASES OF THE NERVOUS SYSTEM AND SENSE ORGANS: Chronic | Status: ACTIVE | Noted: 2024-08-23

## 2024-09-20 ENCOUNTER — APPOINTMENT (OUTPATIENT)
Dept: INFECTIOUS DISEASE | Facility: CLINIC | Age: 84
End: 2024-09-20

## 2024-09-20 PROCEDURE — 99441: CPT

## 2024-09-20 NOTE — HISTORY OF PRESENT ILLNESS
[FreeTextEntry1] : 82 y/o F w/ PMHx HTN, HLD, CKD, b/l CTS, GERD presenting w/ L foot 1st toe drainage and wound, now w/ 1st phalanx OM. Seen by podiatry, L 1st toe tail removed, wound to bone w/ purulence. MRI w/ Osteomyelitis of the first digit distal phalanx. Cx w/ pseudomonas, proteus, E avium, E faecalis, B fragilis.    Cipro 500 mg BID and Augmentin 875/125 mg BID until 10/2/24  Diarrhea: loose stool, no diarrhea  No fever, chills, no pain, no draining at L foot   Saw podiatry, Dr. Barrios, per foot is looking good. See

## 2024-10-06 PROBLEM — M86.172 OTHER ACUTE OSTEOMYELITIS OF LEFT FOOT: Status: ACTIVE | Noted: 2024-10-06
